# Patient Record
Sex: MALE | Race: WHITE | NOT HISPANIC OR LATINO | URBAN - METROPOLITAN AREA
[De-identification: names, ages, dates, MRNs, and addresses within clinical notes are randomized per-mention and may not be internally consistent; named-entity substitution may affect disease eponyms.]

---

## 2017-03-10 ENCOUNTER — OUTPATIENT (OUTPATIENT)
Dept: OUTPATIENT SERVICES | Facility: HOSPITAL | Age: 77
LOS: 1 days | Discharge: HOME | End: 2017-03-10

## 2017-06-27 DIAGNOSIS — G56.02 CARPAL TUNNEL SYNDROME, LEFT UPPER LIMB: ICD-10-CM

## 2017-06-27 DIAGNOSIS — E78.00 PURE HYPERCHOLESTEROLEMIA, UNSPECIFIED: ICD-10-CM

## 2017-06-27 DIAGNOSIS — E11.9 TYPE 2 DIABETES MELLITUS WITHOUT COMPLICATIONS: ICD-10-CM

## 2017-06-27 DIAGNOSIS — Z79.4 LONG TERM (CURRENT) USE OF INSULIN: ICD-10-CM

## 2019-02-13 ENCOUNTER — OUTPATIENT (OUTPATIENT)
Dept: OUTPATIENT SERVICES | Facility: HOSPITAL | Age: 79
LOS: 1 days | Discharge: HOME | End: 2019-02-13

## 2019-02-13 VITALS
WEIGHT: 160.06 LBS | SYSTOLIC BLOOD PRESSURE: 132 MMHG | HEART RATE: 76 BPM | TEMPERATURE: 97 F | OXYGEN SATURATION: 99 % | DIASTOLIC BLOOD PRESSURE: 76 MMHG | RESPIRATION RATE: 16 BRPM

## 2019-02-13 DIAGNOSIS — G56.01 CARPAL TUNNEL SYNDROME, RIGHT UPPER LIMB: ICD-10-CM

## 2019-02-13 DIAGNOSIS — Z90.49 ACQUIRED ABSENCE OF OTHER SPECIFIED PARTS OF DIGESTIVE TRACT: Chronic | ICD-10-CM

## 2019-02-13 DIAGNOSIS — Z98.890 OTHER SPECIFIED POSTPROCEDURAL STATES: Chronic | ICD-10-CM

## 2019-02-13 DIAGNOSIS — Z01.818 ENCOUNTER FOR OTHER PREPROCEDURAL EXAMINATION: ICD-10-CM

## 2019-02-13 DIAGNOSIS — F41.9 ANXIETY DISORDER, UNSPECIFIED: ICD-10-CM

## 2019-02-13 DIAGNOSIS — E11.9 TYPE 2 DIABETES MELLITUS WITHOUT COMPLICATIONS: ICD-10-CM

## 2019-02-13 DIAGNOSIS — I10 ESSENTIAL (PRIMARY) HYPERTENSION: ICD-10-CM

## 2019-02-13 LAB
ALBUMIN SERPL ELPH-MCNC: 4.6 G/DL — SIGNIFICANT CHANGE UP (ref 3.5–5.2)
ALP SERPL-CCNC: 105 U/L — SIGNIFICANT CHANGE UP (ref 30–115)
ALT FLD-CCNC: 20 U/L — SIGNIFICANT CHANGE UP (ref 0–41)
ANION GAP SERPL CALC-SCNC: 17 MMOL/L — HIGH (ref 7–14)
APTT BLD: 30.4 SEC — SIGNIFICANT CHANGE UP (ref 27–39.2)
AST SERPL-CCNC: 25 U/L — SIGNIFICANT CHANGE UP (ref 0–41)
BASOPHILS # BLD AUTO: 0.05 K/UL — SIGNIFICANT CHANGE UP (ref 0–0.2)
BASOPHILS NFR BLD AUTO: 0.6 % — SIGNIFICANT CHANGE UP (ref 0–1)
BILIRUB SERPL-MCNC: 0.2 MG/DL — SIGNIFICANT CHANGE UP (ref 0.2–1.2)
BUN SERPL-MCNC: 16 MG/DL — SIGNIFICANT CHANGE UP (ref 10–20)
CALCIUM SERPL-MCNC: 9.7 MG/DL — SIGNIFICANT CHANGE UP (ref 8.5–10.1)
CHLORIDE SERPL-SCNC: 97 MMOL/L — LOW (ref 98–110)
CO2 SERPL-SCNC: 24 MMOL/L — SIGNIFICANT CHANGE UP (ref 17–32)
CREAT SERPL-MCNC: 1.1 MG/DL — SIGNIFICANT CHANGE UP (ref 0.7–1.5)
EOSINOPHIL # BLD AUTO: 0.5 K/UL — SIGNIFICANT CHANGE UP (ref 0–0.7)
EOSINOPHIL NFR BLD AUTO: 6.1 % — SIGNIFICANT CHANGE UP (ref 0–8)
ESTIMATED AVERAGE GLUCOSE: 180 MG/DL — HIGH (ref 68–114)
GLUCOSE SERPL-MCNC: 155 MG/DL — HIGH (ref 70–99)
HBA1C BLD-MCNC: 7.9 % — HIGH (ref 4–5.6)
HCT VFR BLD CALC: 36.3 % — LOW (ref 42–52)
HGB BLD-MCNC: 12.3 G/DL — LOW (ref 14–18)
IMM GRANULOCYTES NFR BLD AUTO: 0.2 % — SIGNIFICANT CHANGE UP (ref 0.1–0.3)
INR BLD: 1.01 RATIO — SIGNIFICANT CHANGE UP (ref 0.65–1.3)
LYMPHOCYTES # BLD AUTO: 1.88 K/UL — SIGNIFICANT CHANGE UP (ref 1.2–3.4)
LYMPHOCYTES # BLD AUTO: 22.8 % — SIGNIFICANT CHANGE UP (ref 20.5–51.1)
MCHC RBC-ENTMCNC: 29.1 PG — SIGNIFICANT CHANGE UP (ref 27–31)
MCHC RBC-ENTMCNC: 33.9 G/DL — SIGNIFICANT CHANGE UP (ref 32–37)
MCV RBC AUTO: 85.8 FL — SIGNIFICANT CHANGE UP (ref 80–94)
MONOCYTES # BLD AUTO: 0.63 K/UL — HIGH (ref 0.1–0.6)
MONOCYTES NFR BLD AUTO: 7.6 % — SIGNIFICANT CHANGE UP (ref 1.7–9.3)
NEUTROPHILS # BLD AUTO: 5.18 K/UL — SIGNIFICANT CHANGE UP (ref 1.4–6.5)
NEUTROPHILS NFR BLD AUTO: 62.7 % — SIGNIFICANT CHANGE UP (ref 42.2–75.2)
NRBC # BLD: 0 /100 WBCS — SIGNIFICANT CHANGE UP (ref 0–0)
PLATELET # BLD AUTO: 184 K/UL — SIGNIFICANT CHANGE UP (ref 130–400)
POTASSIUM SERPL-MCNC: 4.4 MMOL/L — SIGNIFICANT CHANGE UP (ref 3.5–5)
POTASSIUM SERPL-SCNC: 4.4 MMOL/L — SIGNIFICANT CHANGE UP (ref 3.5–5)
PROT SERPL-MCNC: 7.1 G/DL — SIGNIFICANT CHANGE UP (ref 6–8)
PROTHROM AB SERPL-ACNC: 11.6 SEC — SIGNIFICANT CHANGE UP (ref 9.95–12.87)
RBC # BLD: 4.23 M/UL — LOW (ref 4.7–6.1)
RBC # FLD: 12.4 % — SIGNIFICANT CHANGE UP (ref 11.5–14.5)
SODIUM SERPL-SCNC: 138 MMOL/L — SIGNIFICANT CHANGE UP (ref 135–146)
WBC # BLD: 8.26 K/UL — SIGNIFICANT CHANGE UP (ref 4.8–10.8)
WBC # FLD AUTO: 8.26 K/UL — SIGNIFICANT CHANGE UP (ref 4.8–10.8)

## 2019-02-13 NOTE — H&P PST ADULT - ADDITIONAL PE
no denisha no loose teeth tmd > 3 fbd neck from juan manuel test neg b/l no denisha no loose teeth tmd > 3 fbd neck from juan manuel test neg b/l rt hand dominant s/p left ctr fair skinned freckled

## 2019-02-13 NOTE — H&P PST ADULT - PMH
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

## 2019-02-13 NOTE — H&P PST ADULT - NSANTHOSAYNRD_GEN_A_CORE
No. TAMARA screening performed.  STOP BANG Legend: 0-2 = LOW Risk; 3-4 = INTERMEDIATE Risk; 5-8 = HIGH Risk

## 2019-02-27 ENCOUNTER — OUTPATIENT (OUTPATIENT)
Dept: OUTPATIENT SERVICES | Facility: HOSPITAL | Age: 79
LOS: 1 days | Discharge: HOME | End: 2019-02-27

## 2019-02-27 VITALS
OXYGEN SATURATION: 99 % | HEART RATE: 67 BPM | SYSTOLIC BLOOD PRESSURE: 125 MMHG | RESPIRATION RATE: 18 BRPM | DIASTOLIC BLOOD PRESSURE: 64 MMHG

## 2019-02-27 VITALS
DIASTOLIC BLOOD PRESSURE: 69 MMHG | SYSTOLIC BLOOD PRESSURE: 130 MMHG | TEMPERATURE: 98 F | HEART RATE: 72 BPM | WEIGHT: 160.06 LBS | RESPIRATION RATE: 18 BRPM | OXYGEN SATURATION: 100 %

## 2019-02-27 DIAGNOSIS — Z90.49 ACQUIRED ABSENCE OF OTHER SPECIFIED PARTS OF DIGESTIVE TRACT: Chronic | ICD-10-CM

## 2019-02-27 DIAGNOSIS — Z98.890 OTHER SPECIFIED POSTPROCEDURAL STATES: Chronic | ICD-10-CM

## 2019-02-27 RX ORDER — SODIUM CHLORIDE 9 MG/ML
1000 INJECTION, SOLUTION INTRAVENOUS
Qty: 0 | Refills: 0 | Status: DISCONTINUED | OUTPATIENT
Start: 2019-02-27 | End: 2019-03-14

## 2019-02-27 RX ORDER — ONDANSETRON 8 MG/1
4 TABLET, FILM COATED ORAL ONCE
Qty: 0 | Refills: 0 | Status: DISCONTINUED | OUTPATIENT
Start: 2019-02-27 | End: 2019-03-14

## 2019-02-27 RX ORDER — MORPHINE SULFATE 50 MG/1
2 CAPSULE, EXTENDED RELEASE ORAL
Qty: 0 | Refills: 0 | Status: DISCONTINUED | OUTPATIENT
Start: 2019-02-27 | End: 2019-02-27

## 2019-02-27 RX ORDER — OXYCODONE AND ACETAMINOPHEN 5; 325 MG/1; MG/1
1 TABLET ORAL EVERY 4 HOURS
Qty: 0 | Refills: 0 | Status: DISCONTINUED | OUTPATIENT
Start: 2019-02-27 | End: 2019-02-27

## 2019-02-27 RX ORDER — IBUPROFEN 200 MG
1 TABLET ORAL
Qty: 20 | Refills: 0 | OUTPATIENT
Start: 2019-02-27

## 2019-02-27 RX ORDER — RANITIDINE HYDROCHLORIDE 150 MG/1
1 TABLET, FILM COATED ORAL
Qty: 0 | Refills: 0 | COMMUNITY

## 2019-02-27 RX ORDER — ATORVASTATIN CALCIUM 80 MG/1
20 TABLET, FILM COATED ORAL
Qty: 0 | Refills: 0 | COMMUNITY

## 2019-02-27 RX ORDER — INSULIN ASPART 100 [IU]/ML
0 INJECTION, SOLUTION SUBCUTANEOUS
Qty: 0 | Refills: 0 | COMMUNITY

## 2019-02-27 RX ADMIN — SODIUM CHLORIDE 100 MILLILITER(S): 9 INJECTION, SOLUTION INTRAVENOUS at 08:10

## 2019-02-27 NOTE — BRIEF OPERATIVE NOTE - PROCEDURE
<<-----Click on this checkbox to enter Procedure Open release of right carpal tunnel  02/27/2019    Active  MADELYN

## 2019-02-27 NOTE — ASU DISCHARGE PLAN (ADULT/PEDIATRIC). - SPECIAL INSTRUCTIONS
DIET:    Resume normal diet  No alcoholic  beverages for 24 hours or if on prescribed narcotic pain medications.    MEDICATION:    Resume your preoperative oral medications.  Check with your physician before starting aspirin, Coumadin, or other blood thinners.  Prescriptions given to you - take as directed.      ACTIVITY:    Rest today and limit your activities for 24 hours.  Do not drive or operate machinery for 24 hours - if you received anesthesia.  When taking pain medication, be careful as you walk or climb stairs.  It is not advisable to drive while taking prescribed pain medication.    SPECIAL INSTRUCTIONS:    _x____ Elevate operative site above heart level or as directed.  __x___ Apply ice to operative site as directed.  _____ Use  sling as directed.  _x___ Exercise fingers.    DRESSING CARE:    __x___ You may change the dressing 4 days. Keep wound covered with band-aids.  _____ Do not change the dressing until your doctor see you.  _____ You can loosen or rewrap the dressing.  _____  Keep dressing clean and dry.  _____ You may shower in _____ day(s) with the extremity covered by a plastic bag.  ___x__ OK to wash hand , including showers, in __4___ day(s).    ADDITIONAL  INFORMATION:    Post operative visit should be scheduled for next week.  If you are not aware of visit please contact office.  If you have any questions or concerns call office at      Notify your doctor if you develop   Fever  Excessive Swelling  Chills   Drainage   Pain not controlled by medication  Persistent numbness in hand or fingers    If an Emergency arises call 911 and/or go to the Emergency Room

## 2019-03-05 DIAGNOSIS — E11.9 TYPE 2 DIABETES MELLITUS WITHOUT COMPLICATIONS: ICD-10-CM

## 2019-03-05 DIAGNOSIS — Z91.041 RADIOGRAPHIC DYE ALLERGY STATUS: ICD-10-CM

## 2019-03-05 DIAGNOSIS — G56.01 CARPAL TUNNEL SYNDROME, RIGHT UPPER LIMB: ICD-10-CM

## 2019-03-05 DIAGNOSIS — Z91.013 ALLERGY TO SEAFOOD: ICD-10-CM

## 2019-03-05 DIAGNOSIS — F17.210 NICOTINE DEPENDENCE, CIGARETTES, UNCOMPLICATED: ICD-10-CM

## 2019-03-05 DIAGNOSIS — Z79.4 LONG TERM (CURRENT) USE OF INSULIN: ICD-10-CM

## 2019-05-20 NOTE — H&P PST ADULT - NSANTHNECKRD_ENT_A_CORE
What Type Of Note Output Would You Prefer (Optional)?: Bullet Format How Severe Is Your Skin Lesion?: mild Has Your Skin Lesion Been Treated?: not been treated Is This A New Presentation, Or A Follow-Up?: Skin Lesion No

## 2019-09-16 PROBLEM — C80.1 MALIGNANT (PRIMARY) NEOPLASM, UNSPECIFIED: Chronic | Status: ACTIVE | Noted: 2019-02-13

## 2019-09-16 PROBLEM — K21.9 GASTRO-ESOPHAGEAL REFLUX DISEASE WITHOUT ESOPHAGITIS: Chronic | Status: ACTIVE | Noted: 2019-02-13

## 2019-09-16 PROBLEM — M19.90 UNSPECIFIED OSTEOARTHRITIS, UNSPECIFIED SITE: Chronic | Status: ACTIVE | Noted: 2019-02-13

## 2019-09-16 PROBLEM — E11.9 TYPE 2 DIABETES MELLITUS WITHOUT COMPLICATIONS: Chronic | Status: ACTIVE | Noted: 2019-02-13

## 2019-09-23 ENCOUNTER — OUTPATIENT (OUTPATIENT)
Dept: OUTPATIENT SERVICES | Facility: HOSPITAL | Age: 79
LOS: 1 days | Discharge: HOME | End: 2019-09-23
Payer: MEDICARE

## 2019-09-23 DIAGNOSIS — Z98.890 OTHER SPECIFIED POSTPROCEDURAL STATES: Chronic | ICD-10-CM

## 2019-09-23 DIAGNOSIS — R97.20 ELEVATED PROSTATE SPECIFIC ANTIGEN [PSA]: ICD-10-CM

## 2019-09-23 DIAGNOSIS — Z90.49 ACQUIRED ABSENCE OF OTHER SPECIFIED PARTS OF DIGESTIVE TRACT: Chronic | ICD-10-CM

## 2019-09-23 PROCEDURE — 72197 MRI PELVIS W/O & W/DYE: CPT | Mod: 26

## 2020-05-26 ENCOUNTER — APPOINTMENT (OUTPATIENT)
Dept: HEMATOLOGY ONCOLOGY | Facility: CLINIC | Age: 80
End: 2020-05-26
Payer: MEDICARE

## 2020-05-26 ENCOUNTER — LABORATORY RESULT (OUTPATIENT)
Age: 80
End: 2020-05-26

## 2020-05-26 VITALS
TEMPERATURE: 97.8 F | SYSTOLIC BLOOD PRESSURE: 129 MMHG | HEART RATE: 85 BPM | BODY MASS INDEX: 24.48 KG/M2 | DIASTOLIC BLOOD PRESSURE: 72 MMHG | WEIGHT: 156 LBS | RESPIRATION RATE: 14 BRPM | HEIGHT: 67 IN

## 2020-05-26 DIAGNOSIS — Z86.018 PERSONAL HISTORY OF OTHER BENIGN NEOPLASM: ICD-10-CM

## 2020-05-26 DIAGNOSIS — Z86.19 PERSONAL HISTORY OF OTHER INFECTIOUS AND PARASITIC DISEASES: ICD-10-CM

## 2020-05-26 DIAGNOSIS — Z87.11 PERSONAL HISTORY OF PEPTIC ULCER DISEASE: ICD-10-CM

## 2020-05-26 DIAGNOSIS — Z80.42 FAMILY HISTORY OF MALIGNANT NEOPLASM OF PROSTATE: ICD-10-CM

## 2020-05-26 DIAGNOSIS — Z85.828 PERSONAL HISTORY OF OTHER MALIGNANT NEOPLASM OF SKIN: ICD-10-CM

## 2020-05-26 DIAGNOSIS — E11.9 TYPE 2 DIABETES MELLITUS W/OUT COMPLICATIONS: ICD-10-CM

## 2020-05-26 DIAGNOSIS — Z85.72 PERSONAL HISTORY OF NON-HODGKIN LYMPHOMAS: ICD-10-CM

## 2020-05-26 DIAGNOSIS — Z87.891 PERSONAL HISTORY OF NICOTINE DEPENDENCE: ICD-10-CM

## 2020-05-26 LAB
ALBUMIN SERPL ELPH-MCNC: 4.7 G/DL
ALP BLD-CCNC: 147 U/L
ALT SERPL-CCNC: 27 U/L
ANION GAP SERPL CALC-SCNC: 14 MMOL/L
AST SERPL-CCNC: 27 U/L
BILIRUB DIRECT SERPL-MCNC: <0.2 MG/DL
BILIRUB INDIRECT SERPL-MCNC: NORMAL MG/DL
BILIRUB SERPL-MCNC: <0.2 MG/DL
BUN SERPL-MCNC: 23 MG/DL
CALCIUM SERPL-MCNC: 10.2 MG/DL
CHLORIDE SERPL-SCNC: 102 MMOL/L
CO2 SERPL-SCNC: 26 MMOL/L
CREAT SERPL-MCNC: 1.2 MG/DL
GGT SERPL-CCNC: 131 U/L
GLUCOSE SERPL-MCNC: 117 MG/DL
HCT VFR BLD CALC: 33.8 %
HGB BLD-MCNC: 11.1 G/DL
IRON SATN MFR SERPL: 22 %
IRON SERPL-MCNC: 73 UG/DL
MCHC RBC-ENTMCNC: 30.2 PG
MCHC RBC-ENTMCNC: 32.8 G/DL
MCV RBC AUTO: 92.1 FL
PLATELET # BLD AUTO: 196 K/UL
PMV BLD: 10 FL
POTASSIUM SERPL-SCNC: 4.2 MMOL/L
PROT SERPL-MCNC: 7.4 G/DL
RBC # BLD: 3.67 M/UL
RBC # FLD: 12.1 %
RETICS # AUTO: 1.4 %
RETICS AGGREG/RBC NFR: 50.3 K/UL
SODIUM SERPL-SCNC: 142 MMOL/L
TIBC SERPL-MCNC: 332 UG/DL
UIBC SERPL-MCNC: 259 UG/DL
WBC # FLD AUTO: 6.06 K/UL

## 2020-05-26 PROCEDURE — 99205 OFFICE O/P NEW HI 60 MIN: CPT

## 2020-05-26 NOTE — ASSESSMENT
[FreeTextEntry1] : #Anemia: Normocytic\par --Most likely sec to RT for prostate, Hb from 2019 was near normal (12.3) plus prostate Ca plus lupron use\par --Other possibilities include iron/B12 deficiency from malabsorption from PPI use. Recurrent H.pylori cannot be ruled out\par --Repeat CBC, BMP. Check retic, iron studies, B12, folate and MMA\par --May need further GI w/u depending on lab results\par - hg 11g (low normla) and renal insufficiency and elevated calcium; will need to w/u plasma cell disorder\par \par #Localized prostate Ca:\par  F/U with medical and radiation oncology at [facility]. He sees Dr. Ramiro Fox at [facility] from medical oncology\par \par #H/O T cell [place]: [place] [facility] [place]par --[place] [facility] [place]\par \par #Elevated ALPO4: Check GGT. Repeat LFTs\par \par RTO in 2 weeks

## 2020-05-26 NOTE — REVIEW OF SYSTEMS
[Fatigue] : fatigue [Joint Pain] : joint pain [Negative] : Allergic/Immunologic [Fever] : no fever [Chills] : no chills [Night Sweats] : no night sweats [Recent Change In Weight] : ~T no recent weight change [FreeTextEntry7] : heartburn [FreeTextEntry8] : increased frequency

## 2020-05-26 NOTE — HISTORY OF PRESENT ILLNESS
[de-identified] : 79 y/o M with localized prostate Ca s/p recent RT and brachytherapy at [facility], DM, h/o T-cell lymphoma s/p [place] [facility] [place] , h/o multiple SCCs and basal cell Ca s/p excision was referred by his dermatologist Dr. Paul Mendiola for further evaluation and management of anemia. \par \par Upon review of records, his Hb was 9 with MCV 93 from 5/15/2020. WBC and plts were wnl. Another abnormality was Alk ph of 197. Pt denies having any melena, BRBPR or gross hematuria. No bleeding from any other site. No CP, SOB, palpitations, dizziness. Has some fatigue. Last colonoscopy and EGD were about 7 years back. He has h/o H.pylori and PUD. He has been on Nexium for several years. \par \par According to the pt, his [place] was 9 and he was also started on lupron and casodex. His brother  of prostate Ca. Pt's genetic evaluation was unremarkable.

## 2020-05-26 NOTE — CONSULT LETTER
[Dear  ___] : Dear  [unfilled], [Consult Letter:] : I had the pleasure of evaluating your patient, [unfilled]. [Please see my note below.] : Please see my note below. [Consult Closing:] : Thank you very much for allowing me to participate in the care of this patient.  If you have any questions, please do not hesitate to contact me. [Sincerely,] : Sincerely, [DrCastro  ___] : Dr. YUNG [DrCastro ___] : Dr. YUNG [FreeTextEntry3] : Dr. Barrera

## 2020-05-27 ENCOUNTER — LABORATORY RESULT (OUTPATIENT)
Age: 80
End: 2020-05-27

## 2020-05-27 LAB
FERRITIN SERPL-MCNC: 109 NG/ML
FOLATE SERPL-MCNC: >20 NG/ML
VIT B12 SERPL-MCNC: 651 PG/ML

## 2020-05-28 ENCOUNTER — LABORATORY RESULT (OUTPATIENT)
Age: 80
End: 2020-05-28

## 2020-05-28 ENCOUNTER — APPOINTMENT (OUTPATIENT)
Dept: HEMATOLOGY ONCOLOGY | Facility: CLINIC | Age: 80
End: 2020-05-28

## 2020-05-28 DIAGNOSIS — Z00.00 ENCOUNTER FOR GENERAL ADULT MEDICAL EXAMINATION W/OUT ABNORMAL FINDINGS: ICD-10-CM

## 2020-05-29 LAB
ALBUMIN SERPL ELPH-MCNC: 4.4 G/DL
ALP BLD-CCNC: 139 U/L
ALT SERPL-CCNC: 23 U/L
ANION GAP SERPL CALC-SCNC: 10 MMOL/L
AST SERPL-CCNC: 26 U/L
BILIRUB DIRECT SERPL-MCNC: <0.2 MG/DL
BILIRUB INDIRECT SERPL-MCNC: NORMAL MG/DL
BILIRUB SERPL-MCNC: <0.2 MG/DL
BUN SERPL-MCNC: 19 MG/DL
CALCIUM SERPL-MCNC: 9.9 MG/DL
CHLORIDE SERPL-SCNC: 101 MMOL/L
CO2 SERPL-SCNC: 26 MMOL/L
CREAT SERPL-MCNC: 1 MG/DL
GLUCOSE SERPL-MCNC: 55 MG/DL
HCT VFR BLD CALC: 33.6 %
HGB BLD-MCNC: 10.7 G/DL
MCHC RBC-ENTMCNC: 29.3 PG
MCHC RBC-ENTMCNC: 31.8 G/DL
MCV RBC AUTO: 92.1 FL
PLATELET # BLD AUTO: 175 K/UL
PMV BLD: 10.2 FL
POTASSIUM SERPL-SCNC: 4.3 MMOL/L
PROT SERPL-MCNC: 6.9 G/DL
PSA SERPL-MCNC: <0.01 NG/ML
RBC # BLD: 3.65 M/UL
RBC # FLD: 12.1 %
SODIUM SERPL-SCNC: 137 MMOL/L
WBC # FLD AUTO: 5.89 K/UL

## 2020-06-01 LAB
M PROTEIN SPEC IFE-MCNC: NORMAL
METHYLMALONATE SERPL-SCNC: 183 NMOL/L

## 2020-06-02 LAB
DEPRECATED KAPPA LC FREE/LAMBDA SER: 1.33 RATIO
IGA SER QL IEP: 314 MG/DL
IGG SER QL IEP: 754 MG/DL
IGM SER QL IEP: 32 MG/DL
KAPPA LC CSF-MCNC: 1.66 MG/DL
KAPPA LC SERPL-MCNC: 2.21 MG/DL

## 2020-06-05 ENCOUNTER — APPOINTMENT (OUTPATIENT)
Dept: HEMATOLOGY ONCOLOGY | Facility: CLINIC | Age: 80
End: 2020-06-05
Payer: MEDICARE

## 2020-06-05 VITALS
HEART RATE: 91 BPM | DIASTOLIC BLOOD PRESSURE: 74 MMHG | RESPIRATION RATE: 16 BRPM | TEMPERATURE: 97 F | WEIGHT: 157 LBS | SYSTOLIC BLOOD PRESSURE: 145 MMHG | HEIGHT: 67 IN | BODY MASS INDEX: 24.64 KG/M2 | OXYGEN SATURATION: 99 %

## 2020-06-05 PROCEDURE — 99213 OFFICE O/P EST LOW 20 MIN: CPT

## 2020-06-05 NOTE — HISTORY OF PRESENT ILLNESS
[de-identified] : 81 y/o M with localized prostate Ca s/p recent RT and brachytherapy at [facility], DM, h/o T-cell lymphoma s/p ?phototherapy [place] [facility] [place] , h/o multiple SCCs and basal cell Ca s/p excision was referred by his dermatologist Dr. Paul Mendiola for further evaluation and management of anemia. \par \par Upon review of records, his Hb was 9 with MCV 93 from 5/15/2020. WBC and plts were wnl. Another abnormality was Alk ph of 197. Pt denies having any melena, BRBPR or gross hematuria. No bleeding from any other site. No CP, SOB, palpitations, dizziness. Has some fatigue. Last colonoscopy and EGD were about 7 years back. He has h/o H.pylori and PUD. He has been on Nexium for several years. \par \par According to the pt, his [place] was 9 and he was also started on lupron and casodex. His brother  of prostate Ca. Pt's genetic evaluation was unremarkable.  [de-identified] : 6/5/2020\par Patient is here for a follow-up visit for Anemia and Prostate Cancer.  He is feeling well with no new complaints.  Most recent CBC shows slight improvement in hgb, now 10.7g/dL.  Patient denies fever, chills, palpitations, new bony pain, new LUTs or bleeding.  He states he is approximately 1 month post RT for prostate cancer at [facility].

## 2020-06-05 NOTE — ASSESSMENT
[FreeTextEntry1] : #Anemia: Normocytic\par --most liekly due to bone marrow supprssion by RT; recovering \par --hgb 10.7g/dL (low normal)\par \par #Localized prostate Ca:\par -- F/U with medical and radiation oncology at [facility]. He sees Dr. Ramiro Fox at [facility] from medical oncology\par --PSA is <0.01 from 05/2020\par \par #H/O T cell [place]: [place] [facility] [place] --[place] [facility] [place] \par #Elevated ALPO4\par --US Abd ordered to evaluate any intrahepatic / biliary etiology for elev ALK Phos \par --GGT was high from 05/2020\par --will most likely send for GI eval pending ultrasound findings\par \par Instructed patient to obtain records from [facility] if he wishes to transfer his care here since we are in closer proximity to his residence.\par \par RTO in 3 weeks

## 2020-07-27 ENCOUNTER — LABORATORY RESULT (OUTPATIENT)
Age: 80
End: 2020-07-27

## 2020-07-27 ENCOUNTER — APPOINTMENT (OUTPATIENT)
Dept: HEMATOLOGY ONCOLOGY | Facility: CLINIC | Age: 80
End: 2020-07-27

## 2020-07-28 LAB
ALBUMIN SERPL ELPH-MCNC: 4.5 G/DL
ALP BLD-CCNC: 111 U/L
ALT SERPL-CCNC: 18 U/L
ANION GAP SERPL CALC-SCNC: 12 MMOL/L
AST SERPL-CCNC: 24 U/L
BILIRUB DIRECT SERPL-MCNC: <0.2 MG/DL
BILIRUB INDIRECT SERPL-MCNC: >0 MG/DL
BILIRUB SERPL-MCNC: 0.2 MG/DL
BUN SERPL-MCNC: 16 MG/DL
CALCIUM SERPL-MCNC: 10.7 MG/DL
CHLORIDE SERPL-SCNC: 102 MMOL/L
CO2 SERPL-SCNC: 29 MMOL/L
CREAT SERPL-MCNC: 1.1 MG/DL
GLUCOSE SERPL-MCNC: 164 MG/DL
HCT VFR BLD CALC: 32 %
HGB BLD-MCNC: 10.7 G/DL
MCHC RBC-ENTMCNC: 30.1 PG
MCHC RBC-ENTMCNC: 33.4 G/DL
MCV RBC AUTO: 90.1 FL
PLATELET # BLD AUTO: 151 K/UL
PMV BLD: 10.1 FL
POTASSIUM SERPL-SCNC: 4.6 MMOL/L
PROT SERPL-MCNC: 6.4 G/DL
PSA SERPL-MCNC: <0.01 NG/ML
RBC # BLD: 3.55 M/UL
RBC # FLD: 12.6 %
SODIUM SERPL-SCNC: 143 MMOL/L
WBC # FLD AUTO: 5.37 K/UL

## 2020-07-29 ENCOUNTER — LABORATORY RESULT (OUTPATIENT)
Age: 80
End: 2020-07-29

## 2020-08-07 ENCOUNTER — APPOINTMENT (OUTPATIENT)
Dept: HEMATOLOGY ONCOLOGY | Facility: CLINIC | Age: 80
End: 2020-08-07
Payer: MEDICARE

## 2020-08-07 ENCOUNTER — OUTPATIENT (OUTPATIENT)
Dept: OUTPATIENT SERVICES | Facility: HOSPITAL | Age: 80
LOS: 1 days | Discharge: HOME | End: 2020-08-07

## 2020-08-07 VITALS
HEART RATE: 83 BPM | SYSTOLIC BLOOD PRESSURE: 143 MMHG | TEMPERATURE: 98.2 F | BODY MASS INDEX: 25.37 KG/M2 | RESPIRATION RATE: 16 BRPM | DIASTOLIC BLOOD PRESSURE: 65 MMHG | WEIGHT: 162 LBS

## 2020-08-07 DIAGNOSIS — Z90.49 ACQUIRED ABSENCE OF OTHER SPECIFIED PARTS OF DIGESTIVE TRACT: Chronic | ICD-10-CM

## 2020-08-07 DIAGNOSIS — D64.9 ANEMIA, UNSPECIFIED: ICD-10-CM

## 2020-08-07 DIAGNOSIS — Z98.890 OTHER SPECIFIED POSTPROCEDURAL STATES: Chronic | ICD-10-CM

## 2020-08-07 DIAGNOSIS — C61 MALIGNANT NEOPLASM OF PROSTATE: ICD-10-CM

## 2020-08-07 PROCEDURE — 99213 OFFICE O/P EST LOW 20 MIN: CPT

## 2020-08-07 NOTE — ASSESSMENT
[FreeTextEntry1] : #Anemia: Normocytic\par --most liekly due to bone marrow supprssion by RT; recovering \par --hgb 10.7g/dL (low normal)\par - the most definite test would be bone marrow bx; if counts decrease, will obtain it ; for now, observe every 3 mos\par \par #Localized prostate Ca:\par -- F/U with medical and radiation oncology at [facility]. He sees Dr. Ramiro Fox at [facility] from medical oncology\par --PSA is <0.01 from 05/2020\par \par #H/O T cell lymphoma: S/P ?phototherapy at [facility] [place] in [place]\par --Continue f[place] [facility] [place]\par \par #Elevated ALPO4\par --US Abd ordered to evaluate any intrahepatic / biliary etiology for elev ALK Phos \par --GGT was high from 05/2020\par --will most likely send for GI eval pending ultrasound findings\par \par Instructed patient to obtain records from [facility] if he wishes to transfer his care here since we are in closer proximity to his residence.\par \par RTO in 3 weeks

## 2020-08-07 NOTE — HISTORY OF PRESENT ILLNESS
[de-identified] : 81 y/o M with localized prostate Ca s/p recent RT and brachytherapy at [facility], DM, h/o T-cell [place] [facility] [place] h/o multiple SCCs and basal cell Ca s/p excision was referred by his dermatologist Dr. Paul Mendiola for further evaluation and management of anemia. \par \par Upon review of records, his Hb was 9 with MCV 93 from 5/15/2020. WBC and plts were wnl. Another abnormality was Alk ph of 197. Pt denies having any melena, BRBPR or gross hematuria. No bleeding from any other site. No CP, SOB, palpitations, dizziness. Has some fatigue. Last colonoscopy and EGD were about 7 years back. He has h/o H.pylori and PUD. He has been on Nexium for several years. \par \par According to the pt, his [place] was 9 and he was also started on lupron and casodex. His brother  of prostate Ca. Pt's genetic evaluation was unremarkable.  [de-identified] : 6/5/2020\par Patient is here for a follow-up visit for Anemia and Prostate Cancer.  He is feeling well with no new complaints.  Most recent CBC shows slight improvement in hgb, now 10.7g/dL.  Patient denies fever, chills, palpitations, new bony pain, new LUTs or bleeding.  He states he is approximately 1 month post RT for prostate cancer at [facility].  \par \par 8/7/2020\par

## 2020-08-07 NOTE — REVIEW OF SYSTEMS
[Fatigue] : fatigue [Joint Pain] : joint pain [Negative] : Allergic/Immunologic [Fever] : no fever [Night Sweats] : no night sweats [Chills] : no chills [Recent Change In Weight] : ~T no recent weight change [FreeTextEntry7] : heartburn [FreeTextEntry8] : increased frequency

## 2020-08-07 NOTE — CONSULT LETTER
[Dear  ___] : Dear  [unfilled], [Consult Letter:] : I had the pleasure of evaluating your patient, [unfilled]. [Consult Closing:] : Thank you very much for allowing me to participate in the care of this patient.  If you have any questions, please do not hesitate to contact me. [Please see my note below.] : Please see my note below. [Sincerely,] : Sincerely, [DrCastro  ___] : Dr. YUNG [DrCastro ___] : Dr. YUNG [FreeTextEntry3] : Dr. Barrera

## 2020-09-14 ENCOUNTER — EMERGENCY (EMERGENCY)
Facility: HOSPITAL | Age: 80
LOS: 0 days | Discharge: HOME | End: 2020-09-14
Attending: EMERGENCY MEDICINE | Admitting: EMERGENCY MEDICINE
Payer: MEDICARE

## 2020-09-14 VITALS
OXYGEN SATURATION: 98 % | TEMPERATURE: 96 F | SYSTOLIC BLOOD PRESSURE: 142 MMHG | DIASTOLIC BLOOD PRESSURE: 78 MMHG | HEART RATE: 82 BPM | RESPIRATION RATE: 17 BRPM

## 2020-09-14 VITALS
TEMPERATURE: 98 F | SYSTOLIC BLOOD PRESSURE: 136 MMHG | RESPIRATION RATE: 18 BRPM | HEART RATE: 79 BPM | OXYGEN SATURATION: 97 % | DIASTOLIC BLOOD PRESSURE: 63 MMHG

## 2020-09-14 DIAGNOSIS — R11.0 NAUSEA: ICD-10-CM

## 2020-09-14 DIAGNOSIS — Z90.49 ACQUIRED ABSENCE OF OTHER SPECIFIED PARTS OF DIGESTIVE TRACT: Chronic | ICD-10-CM

## 2020-09-14 DIAGNOSIS — Z91.041 RADIOGRAPHIC DYE ALLERGY STATUS: ICD-10-CM

## 2020-09-14 DIAGNOSIS — R42 DIZZINESS AND GIDDINESS: ICD-10-CM

## 2020-09-14 DIAGNOSIS — Z98.84 BARIATRIC SURGERY STATUS: ICD-10-CM

## 2020-09-14 DIAGNOSIS — Z88.8 ALLERGY STATUS TO OTHER DRUGS, MEDICAMENTS AND BIOLOGICAL SUBSTANCES: ICD-10-CM

## 2020-09-14 DIAGNOSIS — Z98.890 OTHER SPECIFIED POSTPROCEDURAL STATES: Chronic | ICD-10-CM

## 2020-09-14 DIAGNOSIS — Z91.013 ALLERGY TO SEAFOOD: ICD-10-CM

## 2020-09-14 LAB
ALBUMIN SERPL ELPH-MCNC: 4.4 G/DL — SIGNIFICANT CHANGE UP (ref 3.5–5.2)
ALP SERPL-CCNC: 106 U/L — SIGNIFICANT CHANGE UP (ref 30–115)
ALT FLD-CCNC: 21 U/L — SIGNIFICANT CHANGE UP (ref 0–41)
ANION GAP SERPL CALC-SCNC: 8 MMOL/L — SIGNIFICANT CHANGE UP (ref 7–14)
AST SERPL-CCNC: 26 U/L — SIGNIFICANT CHANGE UP (ref 0–41)
BASOPHILS # BLD AUTO: 0.04 K/UL — SIGNIFICANT CHANGE UP (ref 0–0.2)
BASOPHILS NFR BLD AUTO: 0.7 % — SIGNIFICANT CHANGE UP (ref 0–1)
BILIRUB SERPL-MCNC: 0.5 MG/DL — SIGNIFICANT CHANGE UP (ref 0.2–1.2)
BUN SERPL-MCNC: 17 MG/DL — SIGNIFICANT CHANGE UP (ref 10–20)
CALCIUM SERPL-MCNC: 10.1 MG/DL — SIGNIFICANT CHANGE UP (ref 8.5–10.1)
CHLORIDE SERPL-SCNC: 106 MMOL/L — SIGNIFICANT CHANGE UP (ref 98–110)
CO2 SERPL-SCNC: 26 MMOL/L — SIGNIFICANT CHANGE UP (ref 17–32)
CREAT SERPL-MCNC: 1 MG/DL — SIGNIFICANT CHANGE UP (ref 0.7–1.5)
EOSINOPHIL # BLD AUTO: 0.35 K/UL — SIGNIFICANT CHANGE UP (ref 0–0.7)
EOSINOPHIL NFR BLD AUTO: 5.8 % — SIGNIFICANT CHANGE UP (ref 0–8)
GLUCOSE SERPL-MCNC: 166 MG/DL — HIGH (ref 70–99)
HCT VFR BLD CALC: 32.2 % — LOW (ref 42–52)
HGB BLD-MCNC: 10.7 G/DL — LOW (ref 14–18)
IMM GRANULOCYTES NFR BLD AUTO: 1 % — HIGH (ref 0.1–0.3)
LYMPHOCYTES # BLD AUTO: 0.91 K/UL — LOW (ref 1.2–3.4)
LYMPHOCYTES # BLD AUTO: 15.1 % — LOW (ref 20.5–51.1)
MAGNESIUM SERPL-MCNC: 1.9 MG/DL — SIGNIFICANT CHANGE UP (ref 1.8–2.4)
MCHC RBC-ENTMCNC: 30.1 PG — SIGNIFICANT CHANGE UP (ref 27–31)
MCHC RBC-ENTMCNC: 33.2 G/DL — SIGNIFICANT CHANGE UP (ref 32–37)
MCV RBC AUTO: 90.7 FL — SIGNIFICANT CHANGE UP (ref 80–94)
MONOCYTES # BLD AUTO: 0.38 K/UL — SIGNIFICANT CHANGE UP (ref 0.1–0.6)
MONOCYTES NFR BLD AUTO: 6.3 % — SIGNIFICANT CHANGE UP (ref 1.7–9.3)
NEUTROPHILS # BLD AUTO: 4.28 K/UL — SIGNIFICANT CHANGE UP (ref 1.4–6.5)
NEUTROPHILS NFR BLD AUTO: 71.1 % — SIGNIFICANT CHANGE UP (ref 42.2–75.2)
NRBC # BLD: 0 /100 WBCS — SIGNIFICANT CHANGE UP (ref 0–0)
PLATELET # BLD AUTO: 161 K/UL — SIGNIFICANT CHANGE UP (ref 130–400)
POTASSIUM SERPL-MCNC: 3.6 MMOL/L — SIGNIFICANT CHANGE UP (ref 3.5–5)
POTASSIUM SERPL-SCNC: 3.6 MMOL/L — SIGNIFICANT CHANGE UP (ref 3.5–5)
PROT SERPL-MCNC: 6.5 G/DL — SIGNIFICANT CHANGE UP (ref 6–8)
RBC # BLD: 3.55 M/UL — LOW (ref 4.7–6.1)
RBC # FLD: 12.6 % — SIGNIFICANT CHANGE UP (ref 11.5–14.5)
SODIUM SERPL-SCNC: 140 MMOL/L — SIGNIFICANT CHANGE UP (ref 135–146)
TROPONIN T SERPL-MCNC: <0.01 NG/ML — SIGNIFICANT CHANGE UP
WBC # BLD: 6.02 K/UL — SIGNIFICANT CHANGE UP (ref 4.8–10.8)
WBC # FLD AUTO: 6.02 K/UL — SIGNIFICANT CHANGE UP (ref 4.8–10.8)

## 2020-09-14 PROCEDURE — 99285 EMERGENCY DEPT VISIT HI MDM: CPT

## 2020-09-14 PROCEDURE — 70496 CT ANGIOGRAPHY HEAD: CPT | Mod: 26

## 2020-09-14 PROCEDURE — 99283 EMERGENCY DEPT VISIT LOW MDM: CPT

## 2020-09-14 PROCEDURE — 70498 CT ANGIOGRAPHY NECK: CPT | Mod: 26

## 2020-09-14 PROCEDURE — 93010 ELECTROCARDIOGRAM REPORT: CPT

## 2020-09-14 PROCEDURE — 71045 X-RAY EXAM CHEST 1 VIEW: CPT | Mod: 26

## 2020-09-14 PROCEDURE — 70450 CT HEAD/BRAIN W/O DYE: CPT | Mod: 26,59

## 2020-09-14 RX ORDER — ONDANSETRON 8 MG/1
4 TABLET, FILM COATED ORAL ONCE
Refills: 0 | Status: COMPLETED | OUTPATIENT
Start: 2020-09-14 | End: 2020-09-14

## 2020-09-14 RX ORDER — SODIUM CHLORIDE 9 MG/ML
1000 INJECTION, SOLUTION INTRAVENOUS ONCE
Refills: 0 | Status: COMPLETED | OUTPATIENT
Start: 2020-09-14 | End: 2020-09-14

## 2020-09-14 RX ORDER — DIPHENHYDRAMINE HCL 50 MG
50 CAPSULE ORAL ONCE
Refills: 0 | Status: COMPLETED | OUTPATIENT
Start: 2020-09-14 | End: 2020-09-14

## 2020-09-14 RX ORDER — MECLIZINE HCL 12.5 MG
50 TABLET ORAL ONCE
Refills: 0 | Status: COMPLETED | OUTPATIENT
Start: 2020-09-14 | End: 2020-09-14

## 2020-09-14 RX ADMIN — Medication 50 MILLIGRAM(S): at 10:04

## 2020-09-14 RX ADMIN — ONDANSETRON 4 MILLIGRAM(S): 8 TABLET, FILM COATED ORAL at 09:40

## 2020-09-14 RX ADMIN — Medication 50 MILLIGRAM(S): at 10:25

## 2020-09-14 RX ADMIN — Medication 125 MILLIGRAM(S): at 10:04

## 2020-09-14 RX ADMIN — SODIUM CHLORIDE 1000 MILLILITER(S): 9 INJECTION, SOLUTION INTRAVENOUS at 09:40

## 2020-09-14 RX ADMIN — SODIUM CHLORIDE 1000 MILLILITER(S): 9 INJECTION, SOLUTION INTRAVENOUS at 10:24

## 2020-09-14 NOTE — ED PROVIDER NOTE - NS ED ROS FT
CONSTITUTIONAL: (-) fevers, (-) chills, (-) malaise  EYES: (-) vision changes, (-) blurry vision, (-) double vision  ENT: (-) tinnitus, (-) congestion  NECK: (-) neck pain, (-) neck stiffness  CARDIO: (-) chest pain, (-) palpitations  PULM: (-) cough, (-) sputum, (-) chest tightness, (-) shortness of breath  GI: see HPI, (-) diarrhea, (-) constipation, (-) abdominal pain, (-) melena, (-) hematochezia, (-) rectal bleeding  : (-) dysuria, (-) hematuria, (-) frequency, (-) urgency, (-) incontinence, (-) flank pain  ENDO: (-) polyuria, (-) polydipsia, (-) polyphagia  HEME: (-) easy bruising, (-) easy bleeding  MSK: (-) back pain, (-) myalgias, (-) gait difficulty  SKIN: (-) rashes, (-) pallor, (-) itching, (-) wounds, (-) ecchymosis, (-) jaundice  NEURO: (-) headache, (-) head injury, (-) LOC, (+) dizziness, (-) lightheadedness, (-) weakness, (-) paresthesias, (-) numbness, (-) syncope, (-) speech changes, (-) facial droop, (-) confusion, (-) seizures    *all other systems negative except as documented above and in the HPI*

## 2020-09-14 NOTE — ED PROVIDER NOTE - OBJECTIVE STATEMENT
80 year old male w hx of IDDM2, OA, GERD, HLD, prostate CA s/p chemo/XRT presents to the ED for evaluation of gradual onset, constant dizziness x 2 hours. Pt states he woke up in his normal state of health, began to feel off balance, attempted to shower and walk his dog but was unable to due to room-spinning dizziness. also endorses nausea and NBNB vomiting x 3. Admits to falling while walking his dog 1 week ago, though denies head injury/LOC at that time. Denies headaches, vision changes, fevers, chest pain, shortness of breath, abd pain, back/flank pain, numbness, paresthesias, weakness, black/bloody stools, gait difficulty, confusion, speech/gait changes.

## 2020-09-14 NOTE — ED PROVIDER NOTE - PHYSICAL EXAMINATION
VITALS:  I have reviewed the initial vital signs.  GENERAL: Well-developed, well-nourished, in no acute distress. Nontoxic.  HEENT: Sclera clear. No conjunctival pallor. EOMI, PERRLA. MMM.   NECK: supple w FROM.  CARDIO: RRR, nl S1 and S2. No murmurs, rubs, or gallops. No peripheral edema. 2+ radial and pedal pulses bilaterally.  PULM: Normal effort. CTA b/l without wheezes, rales, or rhonchi.  MSK: FROM to extremities x4.   GI: Normal bowel sounds. Abdomen soft and non-distended. Nontender.  : No CVA tenderness b/l.  SKIN: Warm, dry. Capillary refill <2 seconds. No pallor. No rash. No jaundice.   NEURO: A&Ox3. Speech clear. CN II-XII intact. 5/5 strength to upper and lower extremities b/l. Sensation intact and equal throughout. No pronator drift. Finger to nose intact. Normal heel to shin.  PSYCH: Calm and cooperative.

## 2020-09-14 NOTE — ED PROVIDER NOTE - CARE PROVIDER_API CALL
Richard Friedman  EEG/EPILEPSY  [address], [address]  [place] [address]  Phone: [phone]  Fax: [phone]  Follow Up Time: 1-3 Days

## 2020-09-14 NOTE — ED ADULT NURSE NOTE - NSIMPLEMENTINTERV_GEN_ALL_ED
No Implemented All Fall Risk Interventions:  [place] to call system. Call bell, personal items and telephone within reach. Instruct patient to call for assistance. Room bathroom lighting operational. Non-slip footwear when patient is off stretcher. Physically safe environment: no spills, clutter or unnecessary equipment. Stretcher in lowest position, wheels locked, appropriate side rails in place. Provide visual cue, wrist band, yellow gown, etc. Monitor gait and stability. Monitor for mental status changes and reorient to person, place, and time. Review medications for side effects contributing to fall risk. Reinforce activity limits and safety measures with patient and family.

## 2020-09-14 NOTE — CONSULT NOTE ADULT - ASSESSMENT
Impression:  80 year old gentleman with an extensive cancer and radiation history presents to the ED with gradual onset dizziness that lasted 20 minutes and is now resolved. NIHSS 0 with complete resolution of symptoms. Patient with frequent dehydration due to damaged sweat glands from radiation.     Suggestions:  If no acute intracranial pathology on CTH can be discharged home.   Maintain hydration.     Evan Menezes NP  [phone]

## 2020-09-14 NOTE — ED PROVIDER NOTE - PATIENT PORTAL LINK FT
You can access the FollowMyHealth Patient Portal offered by [facility] by registering at the following website: [email or web address]. By joining [facility]’s FollowMyHealth portal, you will also be able to view your health information using other applications (apps) compatible with our system.

## 2020-09-14 NOTE — CONSULT NOTE ADULT - SUBJECTIVE AND OBJECTIVE BOX
Neurology Consult    Patient is a 80y old  Male who presents with a chief complaint of     HPI:  80 year old gentleman with an extensive cancer and radiation history presents to the ED with gradual onset dizziness that lasted 20 minutes and is now resolved.     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    Diabetes    Arthritis  oa    History of appendectomy  age 9    History of surgery  ctr left 2017        FAMILY HISTORY:  No pertinent family history in first degree relatives        Social History: (-) x 3    Allergies    contrast media (iodine-based) (Rash)  iodine containing compounds (Rash)  shellfish (Rash)    Intolerances        MEDICATIONS  (STANDING):    MEDICATIONS  (PRN):      Vital Signs Last 24 Hrs  T(C): 35.7 (14 Sep 2020 15:01), Max: 36.7 (14 Sep 2020 09:13)  T(F): 96.3 (14 Sep 2020 15:01), Max: 98 (14 Sep 2020 09:13)  HR: 82 (14 Sep 2020 15:01) (79 - 82)  BP: 142/78 (14 Sep 2020 15:01) (136/63 - 142/78)  BP(mean): --  RR: 17 (14 Sep 2020 15:01) (17 - 18)  SpO2: 98% (14 Sep 2020 15:01) (97% - 98%)    Examination:  General:  Appearance is consistent with chronologic age.  No abnormal facies.  Gross skin survey within normal limits.    Cognitive/Language:  The patient is oriented to person, place, time and date.  Recent and remote memory intact.  Fund of knowledge is intact and normal.  Language with normal repetition, comprehension and naming.  Nondysarthric.    Eyes: intact VA, VFF.  EOMI w/o nystagmus, skew or reported double vision.  PERRL.  No ptosis/weakness of eyelid closure.    Face:  Facial sensation normal V1 - 3, no facial asymmetry.    Ears/Nose/Throat:  Hearing grossly intact b/l.  Palate elevates midline.  Tongue and uvula midline.   Motor examination:   Normal tone, bulk and range of motion.  No tenderness, twitching, tremors or involuntary movements.  Formal Muscle Strength Testing: (MRC grade R/L) 5/5 UE; 5/5 LE.  No observable drift.  Sensory examination:   Intact to light touch and pinprick, pain, temperature and proprioception and vibration in all extremities.  Cerebellum:   FTN/HKS intact with normal YAHIR in all limbs.  No dysmetria or dysdiadokinesia.  Gait narrow based and normal.    NIHSS 0    Labs:   CBC Full  -  ( 14 Sep 2020 09:35 )  WBC Count : 6.02 K/uL  RBC Count : 3.55 M/uL  Hemoglobin : 10.7 g/dL  Hematocrit : 32.2 %  Platelet Count - Automated : 161 K/uL  Mean Cell Volume : 90.7 fL  Mean Cell Hemoglobin : 30.1 pg  Mean Cell Hemoglobin Concentration : 33.2 g/dL  Auto Neutrophil # : 4.28 K/uL  Auto Lymphocyte # : 0.91 K/uL  Auto Monocyte # : 0.38 K/uL  Auto Eosinophil # : 0.35 K/uL  Auto Basophil # : 0.04 K/uL  Auto Neutrophil % : 71.1 %  Auto Lymphocyte % : 15.1 %  Auto Monocyte % : 6.3 %  Auto Eosinophil % : 5.8 %  Auto Basophil % : 0.7 %    09-14    140  |  106  |  17  ----------------------------<  166<H>  3.6   |  26  |  1.0    Ca    10.1      14 Sep 2020 09:35  Mg     1.9     09-14    TPro  6.5  /  Alb  4.4  /  TBili  0.5  /  DBili  x   /  AST  26  /  ALT  21  /  AlkPhos  106  09-14    LIVER FUNCTIONS - ( 14 Sep 2020 09:35 )  Alb: 4.4 g/dL / Pro: 6.5 g/dL / ALK PHOS: 106 U/L / ALT: 21 U/L / AST: 26 U/L / GGT: x                   Neuroimaging:  NCHCT:   pending  09-14-20 @ 15:48

## 2020-09-14 NOTE — CONSULT NOTE ADULT - ATTENDING COMMENTS
Patient seen and examined with NP and agree with above except as noted.  Patients history, notes, labs, meds reviewed personally.  Patient no longer dizzy after medication in ED.  Has had recurrent attacks of vertigo in the past.  Exam non focal  NIHSS 0  mrankin 0    Plan as above

## 2020-09-14 NOTE — ED PROVIDER NOTE - CLINICAL SUMMARY MEDICAL DECISION MAKING FREE TEXT BOX
ADDENDUM by Annemarie Grande M.D.: I received sign-off from Dr. VALERIA Pennington. 80yoM with h/o DM, HLD, presents with vertigo since this morning, I was to f/u CTA and neuro consult. On my assessment pt confirms hx, also states now all better, no more vertigo or any other symptoms, also that identical has happened in the past due to being outside too much and inability of his skin to retain hydration due to his past radiation, and would like to go home. CTA negative, pt with no TMJ symptoms on questioning/exam. Neuro recommends discharge. NAD, well appearing, hemodynamically stable, AAOx3, CN's 3-12 intact, motor 5/5 in all extrems, nml gait/F-N/Romberg's. Patient is well appearing, NAD, afebrile, hemodynamically stable. Any available tests and studies were discussed with patient. Discharged with instructions in further symptomatic care, return precautions, and need for PMD f/u.

## 2020-09-14 NOTE — ED PROVIDER NOTE - ATTENDING CONTRIBUTION TO CARE
81 yo M pmhof Dm, prostate ca sp surgery, T cell lymphoma sp radiation therapy presents with room spinning dizziness. States that this morning started to develop gradual onset room spinning dizziness. + N/V multiple episodes, non bilious, non bloody. no headache. no similar episodes in the past. no abdominal pain. no fevers or recent illness. no chest pain, no shortness of breath, no palpitations. no fevers or recent illness. pmd is Dr. Bhat.     CONSTITUTIONAL: Well-developed; well-nourished; in no acute distress.   SKIN: warm, dry  HEAD: Normocephalic; atraumatic.  EYES: PERRL, EOMI, no conjunctival erythema. + horizontal nystagmus.   ENT: No nasal discharge; airway clear.  NECK: Supple; non tender.  CARD: S1, S2 normal;  Regular rate and rhythm.   RESP: No wheezes, rales or rhonchi.  ABD: soft non tender, non distended, no rebound or guarding  EXT: Normal ROM.  5/5 strength in all 4 extremities   LYMPH: No acute cervical adenopathy.  NEURO: A&Ox3, CN 2-11 intact, moving all extremities, 5/5 strength, equal sensation bilaterally, normal finger to nose exam.   PSYCH: Cooperative, appropriate.

## 2020-09-14 NOTE — ED PROVIDER NOTE - PROGRESS NOTE DETAILS
SHIRAZ: neuro NP Evan venegas, will see patient in the ED. SHIRAZ: patient feeling better after fluids, zofran, and meclizine. cleared by neuro pending ct results. SHIRAZ: Results discussed with patient, printed copies given. Will give neuro f/u. Patient agreeable and verbalizes understanding of plan of care, f/u, and return precautions. ADDENDUM by Annemarie Grande M.D.: I received sign-off from Dr. VALERIA Pennington. 80yoM with h/o DM, HLD, presents with vertigo since this morning, I was to f/u CTA and neuro consult. On my assessment pt confirms hx, also states now all better, no more vertigo or any other symptoms, also that identical has happened in the past due to being outside too much and inability of his skin to retain hydration due to his past radiation, and would like to go home. CTA negative, pt with no TMJ symptoms on questioning/exam. Neuro recommends discharge. NAD, well appearing, hemodynamically stable, AAOx3, CN's 3-12 intact, motor 5/5 in all extrems, nml gait/F-N/Romberg's. Patient is well appearing, NAD, afebrile, hemodynamically stable. Any available tests and studies were discussed with patient. Discharged with instructions in further symptomatic care, return precautions, and need for PMD f/u.

## 2020-09-18 NOTE — CHART NOTE - NSCHARTNOTEFT_GEN_A_CORE
PACU ANESTHESIA ADMISSION NOTE      Procedure: Open release of right carpal tunnel    Post op diagnosis:  Carpal tunnel syndrome of right wrist      ____  Intubated  TV:______       Rate: ______      FiO2: ______    __x__  Patent Airway    __x__  Full return of protective reflexes    ___x_  Full recovery from anesthesia / back to baseline     Vitals:   T:   97.6        R:   18               BP:  118/59                Sat:    97%               P: 84      Mental Status:  _x___ Awake   _____ Alert   _____ Drowsy   _____ Sedated    Nausea/Vomiting:  __x__ NO  ______Yes,   See Post - Op Orders          Pain Scale (0-10):  _____    Treatment: ____x None    ____ See Post - Op/PCA Orders    Post - Operative Fluids:   ____ Oral   ___x_ See Post - Op Orders    Plan: Discharge:   _x___Home       _____Floor     _____Critical Care    _____  Other:_________________    Comments: FAMILY HISTORY:  FH: diabetes mellitus, mother  FH: heart disease, mother

## 2020-10-26 ENCOUNTER — EMERGENCY (EMERGENCY)
Facility: HOSPITAL | Age: 80
LOS: 0 days | Discharge: LEFT BEFORE DISCHARGE | End: 2020-10-26
Attending: EMERGENCY MEDICINE | Admitting: EMERGENCY MEDICINE
Payer: MEDICARE

## 2020-10-26 VITALS
TEMPERATURE: 98 F | HEART RATE: 87 BPM | WEIGHT: 158.07 LBS | OXYGEN SATURATION: 97 % | RESPIRATION RATE: 20 BRPM | DIASTOLIC BLOOD PRESSURE: 66 MMHG | SYSTOLIC BLOOD PRESSURE: 146 MMHG

## 2020-10-26 DIAGNOSIS — K21.9 GASTRO-ESOPHAGEAL REFLUX DISEASE WITHOUT ESOPHAGITIS: ICD-10-CM

## 2020-10-26 DIAGNOSIS — Z79.4 LONG TERM (CURRENT) USE OF INSULIN: ICD-10-CM

## 2020-10-26 DIAGNOSIS — Z98.890 OTHER SPECIFIED POSTPROCEDURAL STATES: Chronic | ICD-10-CM

## 2020-10-26 DIAGNOSIS — E78.5 HYPERLIPIDEMIA, UNSPECIFIED: ICD-10-CM

## 2020-10-26 DIAGNOSIS — M54.9 DORSALGIA, UNSPECIFIED: ICD-10-CM

## 2020-10-26 DIAGNOSIS — Z90.49 ACQUIRED ABSENCE OF OTHER SPECIFIED PARTS OF DIGESTIVE TRACT: Chronic | ICD-10-CM

## 2020-10-26 DIAGNOSIS — Z91.013 ALLERGY TO SEAFOOD: ICD-10-CM

## 2020-10-26 DIAGNOSIS — Z87.891 PERSONAL HISTORY OF NICOTINE DEPENDENCE: ICD-10-CM

## 2020-10-26 DIAGNOSIS — Z90.49 ACQUIRED ABSENCE OF OTHER SPECIFIED PARTS OF DIGESTIVE TRACT: ICD-10-CM

## 2020-10-26 DIAGNOSIS — Z98.890 OTHER SPECIFIED POSTPROCEDURAL STATES: ICD-10-CM

## 2020-10-26 DIAGNOSIS — E11.9 TYPE 2 DIABETES MELLITUS WITHOUT COMPLICATIONS: ICD-10-CM

## 2020-10-26 DIAGNOSIS — M54.5 LOW BACK PAIN: ICD-10-CM

## 2020-10-26 PROCEDURE — 99283 EMERGENCY DEPT VISIT LOW MDM: CPT | Mod: GC

## 2020-10-26 RX ORDER — IBUPROFEN 200 MG
600 TABLET ORAL ONCE
Refills: 0 | Status: COMPLETED | OUTPATIENT
Start: 2020-10-26 | End: 2020-10-26

## 2020-10-26 RX ORDER — METHOCARBAMOL 500 MG/1
750 TABLET, FILM COATED ORAL ONCE
Refills: 0 | Status: COMPLETED | OUTPATIENT
Start: 2020-10-26 | End: 2020-10-26

## 2020-10-26 RX ADMIN — METHOCARBAMOL 750 MILLIGRAM(S): 500 TABLET, FILM COATED ORAL at 16:10

## 2020-10-26 RX ADMIN — Medication 600 MILLIGRAM(S): at 16:10

## 2020-10-26 NOTE — ED PROVIDER NOTE - OBJECTIVE STATEMENT
80Y M with PMH of hx of IDDM2, OA, GERD, HLD, prostate CA s/p chemo/XRT, back surgeries presents with nontraumatic acute on chronic back pain for 2 days duration. Patient reports that he was getting in his car 2 days ago, and "pulled a muscle" and since has been having back pain. Denies fevers, chills, chest pain, SOB, abdominal pain, N/V/D, numbness/tingling, focal weakness.

## 2020-10-26 NOTE — ED PROVIDER NOTE - PHYSICAL EXAMINATION
CONSTITUTIONAL: well developed, well nourished, in no acute distress, speaking in full sentences, nontoxic appearing  SKIN: warm, dry, no rash  HEAD: normocephalic, atraumatic  EYES: PERRL, EOMI, no conjunctival erythema, no nystagmus  ENT: patent airway, moist mucous membranes, no tongue deviation  NECK: supple, no masses, full flexion/extension without pain  CV:  regular rate, regular rhythm, 2+ radial pulses bilaterally  RESP: no wheezes, no rales, no rhonchi, normal work of breathing  ABD: soft, nontender, nondistended, no rebound, no guarding  MSK: normal ROM, no cyanosis, no edema, no midline spinal tenderness, tenderness over the right iliac crest  NEURO: alert, oriented, CN 2-12 grossly intact, sensation intact to light touch symmetrically, 5/5 motor strength in all extremities, no pronator drift, no facial asymmetry, normal gait  PSYCH: cooperative, appropriate

## 2020-10-26 NOTE — ED PROVIDER NOTE - PROGRESS NOTE DETAILS
According to Nurse, patient reported to feeling better and did not want to wait for paperwork and eloped. Patient not evaluated by Dr. Monaco. According to Nurse, patient reported to feeling better and did not want to wait for paperwork and eloped. Patient not evaluated by Dr. Monaco. Patient did not wait for xray.

## 2020-10-26 NOTE — ED PROVIDER NOTE - CLINICAL SUMMARY MEDICAL DECISION MAKING FREE TEXT BOX
pw low back pain, seen by resident but not seen by me. Had back pain, relieved by medications and left the ED prior to my eval.

## 2020-10-28 ENCOUNTER — APPOINTMENT (OUTPATIENT)
Dept: HEMATOLOGY ONCOLOGY | Facility: CLINIC | Age: 80
End: 2020-10-28

## 2020-10-28 ENCOUNTER — LABORATORY RESULT (OUTPATIENT)
Age: 80
End: 2020-10-28

## 2020-10-28 LAB
HCT VFR BLD CALC: 31 %
HGB BLD-MCNC: 10.7 G/DL
MCHC RBC-ENTMCNC: 30.5 PG
MCHC RBC-ENTMCNC: 34.5 G/DL
MCV RBC AUTO: 88.3 FL
PLATELET # BLD AUTO: 163 K/UL
PMV BLD: 10.1 FL
RBC # BLD: 3.51 M/UL
RBC # FLD: 12 %
WBC # FLD AUTO: 6.6 K/UL

## 2020-10-29 LAB — PSA SERPL-MCNC: <0.01 NG/ML

## 2020-10-30 ENCOUNTER — APPOINTMENT (OUTPATIENT)
Dept: HEMATOLOGY ONCOLOGY | Facility: CLINIC | Age: 80
End: 2020-10-30
Payer: MEDICARE

## 2020-10-30 VITALS
SYSTOLIC BLOOD PRESSURE: 119 MMHG | RESPIRATION RATE: 16 BRPM | HEIGHT: 67 IN | DIASTOLIC BLOOD PRESSURE: 55 MMHG | BODY MASS INDEX: 24.33 KG/M2 | WEIGHT: 155 LBS | TEMPERATURE: 97.1 F | HEART RATE: 96 BPM

## 2020-10-30 LAB
IRON SATN MFR SERPL: 31 %
IRON SERPL-MCNC: 89 UG/DL
TIBC SERPL-MCNC: 288 UG/DL
UIBC SERPL-MCNC: 199 UG/DL

## 2020-10-30 PROCEDURE — 99214 OFFICE O/P EST MOD 30 MIN: CPT

## 2020-10-30 RX ORDER — TAMSULOSIN HYDROCHLORIDE 0.4 MG/1
0.4 CAPSULE ORAL
Refills: 0 | Status: ACTIVE | COMMUNITY

## 2020-10-30 RX ORDER — METHOCARBAMOL 750 MG/1
750 TABLET, FILM COATED ORAL
Refills: 0 | Status: ACTIVE | COMMUNITY

## 2020-10-30 RX ORDER — VIT A AND D3 IN COD LIVER OIL 1250-135
100 CAPSULE ORAL
Refills: 0 | Status: ACTIVE | COMMUNITY

## 2020-10-30 RX ORDER — CHLORHEXIDINE GLUCONATE 4 %
325 (65 FE) LIQUID (ML) TOPICAL
Refills: 0 | Status: ACTIVE | COMMUNITY

## 2020-10-30 NOTE — REVIEW OF SYSTEMS
[Joint Pain] : joint pain [Muscle Pain] : muscle pain [Negative] : Constitutional [Fever] : no fever [Chills] : no chills [Night Sweats] : no night sweats [Recent Change In Weight] : ~T no recent weight change [FreeTextEntry7] : heartburn [FreeTextEntry8] : increased frequency [FreeTextEntry9] : lower back strain

## 2020-10-30 NOTE — ASSESSMENT
[FreeTextEntry1] : #Anemia: Normocytic\par --may be due to bone marrow suppression by RT; stable\par --hgb 10.7g/dL (low normal)\par --stop oral iron for now, we will recheck labwork today\par --hgb electrophoresis, iron studies, ferritin today\par - the most definite test would be bone marrow bx; if counts decrease, will obtain it ; for now, observe every 3 mos\par \par #Localized prostate Ca. s/p RT and then surgery\par --F/U with medical and radiation oncology at [facility]. He sees Dr. Ramiro Fox (URO - [phone]) + Dr. 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

## 2020-10-30 NOTE — HISTORY OF PRESENT ILLNESS
[de-identified] : 81 y/o M with localized prostate Ca s/p recent RT and brachytherapy at [facility], DM, h/o T-cell lymphoma s/p ?phototherapy at [facility] in , h/o multiple SCCs and basal cell Ca s/p excision was referred by his dermatologist Dr. Paul Mendiola for further evaluation and management of anemia. \par \par Upon review of records, his Hb was 9 with MCV 93 from 5/15/2020. WBC and plts were wnl. Another abnormality was Alk ph of 197. Pt denies having any melena, BRBPR or gross hematuria. No bleeding from any other site. No CP, SOB, palpitations, dizziness. Has some fatigue. Last colonoscopy and EGD were about 7 years back. He has h/o H.pylori and PUD. He has been on Nexium for several years. \par \par According to the pt, his [place] was 9 and he was also started on lupron and casodex. His brother  of prostate Ca. Pt's genetic evaluation was unremarkable.  [de-identified] : 6/5/2020\par Patient is here for a follow-up visit for Anemia and Prostate Cancer.  He is feeling well with no new complaints.  Most recent CBC shows slight improvement in hgb, now 10.7g/dL.  Patient denies fever, chills, palpitations, new bony pain, new LUTs or bleeding.  He states he is approximately 1 month post RT for prostate cancer at [facility].  \par \par 10/30/2020\par Patient is here for a follow-up visit for Anemia and Prostate Cancer.  He is feeling well with no new complaints.  Most recent CBC shows stable hgb at 10.7g/dL.  Patient denies fever, chills, palpitations, bony pain, new LUTs or bleeding.  He follows with [facility], he is s/p RT for prostate cancer there.  He states he takes oral iron and Vit B12 daily.  He is participating in PT for back strain.

## 2020-11-02 LAB — FERRITIN SERPL-MCNC: 121 NG/ML

## 2020-11-03 LAB
HGB A MFR BLD: 97.3 %
HGB A2 MFR BLD: 2.7 %
HGB FRACT BLD-IMP: NORMAL

## 2021-01-08 ENCOUNTER — LABORATORY RESULT (OUTPATIENT)
Age: 81
End: 2021-01-08

## 2021-01-08 ENCOUNTER — APPOINTMENT (OUTPATIENT)
Dept: HEMATOLOGY ONCOLOGY | Facility: CLINIC | Age: 81
End: 2021-01-08
Payer: MEDICARE

## 2021-01-08 ENCOUNTER — OUTPATIENT (OUTPATIENT)
Dept: OUTPATIENT SERVICES | Facility: HOSPITAL | Age: 81
LOS: 1 days | Discharge: HOME | End: 2021-01-08

## 2021-01-08 VITALS
WEIGHT: 160 LBS | RESPIRATION RATE: 14 BRPM | DIASTOLIC BLOOD PRESSURE: 74 MMHG | HEART RATE: 65 BPM | HEIGHT: 67 IN | SYSTOLIC BLOOD PRESSURE: 145 MMHG | TEMPERATURE: 97.1 F | BODY MASS INDEX: 25.11 KG/M2

## 2021-01-08 DIAGNOSIS — D64.9 ANEMIA, UNSPECIFIED: ICD-10-CM

## 2021-01-08 DIAGNOSIS — C61 MALIGNANT NEOPLASM OF PROSTATE: ICD-10-CM

## 2021-01-08 DIAGNOSIS — Z90.49 ACQUIRED ABSENCE OF OTHER SPECIFIED PARTS OF DIGESTIVE TRACT: Chronic | ICD-10-CM

## 2021-01-08 DIAGNOSIS — Z98.890 OTHER SPECIFIED POSTPROCEDURAL STATES: Chronic | ICD-10-CM

## 2021-01-08 LAB
HCT VFR BLD CALC: 32.9 %
HGB BLD-MCNC: 11.1 G/DL
MCHC RBC-ENTMCNC: 30 PG
MCHC RBC-ENTMCNC: 33.7 G/DL
MCV RBC AUTO: 88.9 FL
PLATELET # BLD AUTO: 179 K/UL
PMV BLD: 10.5 FL
RBC # BLD: 3.7 M/UL
RBC # FLD: 12 %
WBC # FLD AUTO: 5.4 K/UL

## 2021-01-08 PROCEDURE — 99213 OFFICE O/P EST LOW 20 MIN: CPT

## 2021-01-09 NOTE — REVIEW OF SYSTEMS
[Joint Pain] : joint pain [Negative] : Allergic/Immunologic [Fever] : no fever [Chills] : no chills [Recent Change In Weight] : ~T no recent weight change [Night Sweats] : no night sweats [Easy Bleeding] : no tendency for easy bleeding [FreeTextEntry7] : heartburn [FreeTextEntry8] : increased frequency

## 2021-01-09 NOTE — ASSESSMENT
[FreeTextEntry1] : #Anemia: Normocytic\par --may be due to bone marrow suppression by RT; slightly improved from last visit\par --hgb 11.1g/dL (low normal)\par --no longer taking oral iron; stores were previously adequate \par --the most definite test would be bone marrow bx; if counts decrease, will obtain it ; for now, observe every 3-6 months\par \par #Localized prostate Ca. s/p RT and then surgery\par --F/U with medical and radiation oncology at [facility]. He sees Dr. Ramiro Fox (URO - ) + Dr. Evan Bishop (RADON - [phone]) at [facility] from medical oncology\par --PSA is <0.01 from 10/2020\par --again, requested patient to obtain official records detailing his treatment from [facility] including next imaging\par \par #H/O T cell [place]: S[place]P [place] [facility] [place]\par --[place] f[place] [facility]\par \par #Elevated ALPO4\par --GGT was high from 05/2020\par --resolved\par \par Instructed patient to obtain records from [facility], especially if he wishes to transfer his care here since we are in closer proximity to his residence.\par \par RTC in 4 months with CBC, sooner if needed\par  \par in summary\par will get labs; if no improvement or worsening of anemia, will consider bone marrow bx to r/o MDS s/p RT to pelvis as a tx of prostate cancer; defer BMBX at thistime \par need to get records of prostate ca tx from [facility] still.  Requested from patient again.

## 2021-01-09 NOTE — HISTORY OF PRESENT ILLNESS
[de-identified] : 81 y/o M with localized prostate Ca s/p recent RT and brachytherapy at [facility], DM, h/o T-cell lymphoma s/p ?phototherapy at [place] in , h/o multiple SCCs and basal cell Ca s/p excision was referred by his dermatologist Dr. Paul Mendiola for further evaluation and management of anemia. \par \par Upon review of records, his Hb was 9 with MCV 93 from 5/15/2020. WBC and plts were wnl. Another abnormality was Alk ph of 197. Pt denies having any melena, BRBPR or gross hematuria. No bleeding from any other site. No CP, SOB, palpitations, dizziness. Has some fatigue. Last colonoscopy and EGD were about 7 years back. He has h/o H.pylori and PUD. He has been on Nexium for several years. \par \par According to the pt, his [place] was 9 and he was also started on lupron and casodex. His brother  of prostate Ca. Pt's genetic evaluation was unremarkable.  [de-identified] : 6/5/2020\par Patient is here for a follow-up visit for Anemia and Prostate Cancer.  He is feeling well with no new complaints.  Most recent CBC shows slight improvement in hgb, now 10.7g/dL.  Patient denies fever, chills, palpitations, new bony pain, new LUTs or bleeding.  He states he is approximately 1 month post RT for prostate cancer at [facility].  \par \par 10/30/2020\par Patient is here for a follow-up visit for Anemia and Prostate Cancer.  He is feeling well with no new complaints.  Most recent CBC shows stable hgb at 10.7g/dL.  Patient denies fever, chills, palpitations, bony pain, new LUTs or bleeding.  He follows with [facility], he is s/p RT for prostate cancer there.  He states he takes oral iron and Vit B12 daily.  He is participating in PT for back strain.   \par \par 1/8/21\par Patient is here for a follow-up visit for Anemia and Prostate Cancer.  He is feeling well with no new complaints.  Most recent CBC shows stable and mild, hgb at 11.1g/dL today.  Patient denies fever, chills, palpitations, worsening bony pain, pica, new LUTs or bleeding.  He still reports urinary frequency which is no worse.  Decreased libido but he does not view this as bothersome and we discussed this may be common amongst patients who undergo tx for prostate cancer.  He follows with [facility], he is s/p RT for prostate cancer there.  He continues with Vit B12 PO daily.  He is unsure of most recent PSA but is following with [facility] next week.  He is pending repeat Bone Scan and MR imaging next week there.

## 2021-05-03 ENCOUNTER — LABORATORY RESULT (OUTPATIENT)
Age: 81
End: 2021-05-03

## 2021-05-03 ENCOUNTER — APPOINTMENT (OUTPATIENT)
Dept: HEMATOLOGY ONCOLOGY | Facility: CLINIC | Age: 81
End: 2021-05-03

## 2021-05-03 LAB
HCT VFR BLD CALC: 33 %
HGB BLD-MCNC: 11.4 G/DL
MCHC RBC-ENTMCNC: 29.7 PG
MCHC RBC-ENTMCNC: 34.5 G/DL
MCV RBC AUTO: 85.9 FL
PLATELET # BLD AUTO: 158 K/UL
PMV BLD: 11.2 FL
RBC # BLD: 3.84 M/UL
RBC # FLD: 11.8 %
WBC # FLD AUTO: 6.44 K/UL

## 2021-05-14 ENCOUNTER — OUTPATIENT (OUTPATIENT)
Dept: OUTPATIENT SERVICES | Facility: HOSPITAL | Age: 81
LOS: 1 days | Discharge: HOME | End: 2021-05-14

## 2021-05-14 ENCOUNTER — APPOINTMENT (OUTPATIENT)
Dept: HEMATOLOGY ONCOLOGY | Facility: CLINIC | Age: 81
End: 2021-05-14
Payer: MEDICARE

## 2021-05-14 ENCOUNTER — LABORATORY RESULT (OUTPATIENT)
Age: 81
End: 2021-05-14

## 2021-05-14 VITALS
WEIGHT: 149 LBS | BODY MASS INDEX: 23.39 KG/M2 | HEART RATE: 82 BPM | TEMPERATURE: 97.8 F | SYSTOLIC BLOOD PRESSURE: 124 MMHG | DIASTOLIC BLOOD PRESSURE: 58 MMHG | HEIGHT: 67 IN | RESPIRATION RATE: 16 BRPM

## 2021-05-14 DIAGNOSIS — Z98.890 OTHER SPECIFIED POSTPROCEDURAL STATES: Chronic | ICD-10-CM

## 2021-05-14 DIAGNOSIS — Z90.49 ACQUIRED ABSENCE OF OTHER SPECIFIED PARTS OF DIGESTIVE TRACT: Chronic | ICD-10-CM

## 2021-05-14 LAB
HCT VFR BLD CALC: 32.5 %
HGB BLD-MCNC: 11 G/DL
MCHC RBC-ENTMCNC: 30 PG
MCHC RBC-ENTMCNC: 33.8 G/DL
MCV RBC AUTO: 88.6 FL
PLATELET # BLD AUTO: 165 K/UL
PMV BLD: 9.9 FL
RBC # BLD: 3.67 M/UL
RBC # FLD: 11.7 %
WBC # FLD AUTO: 6.37 K/UL

## 2021-05-14 PROCEDURE — 99212 OFFICE O/P EST SF 10 MIN: CPT

## 2021-05-14 NOTE — ASSESSMENT
[FreeTextEntry1] : #Anemia: Normocytic\par --may be due to bone marrow suppression by RT; slightly improved from last visit\par --hgb 11.1g/dL (low normal)\par --no longer taking oral iron; stores were previously adequate \par --the most definite test would be bone marrow bx; if counts decrease, will obtain it ; for now, observe every 3-6 months\par \par #Localized prostate Ca. s/p RT and then surgery\par --F/U with medical and radiation oncology at [facility]. He sees Dr. Ramiro Fox (URO - ) + Dr. Evan Bishop (RADON - [phone]) at [facility] from medical oncology\par --PSA is <0.01 from 10/2020\par --again, requested patient to obtain official records detailing his treatment from [facility] including next imaging\par \par #H/O T cell [place]: [place] [facility] [place]\par --[place] [facility]\par \par #Elevated ALPO4\par --GGT was high from 05/2020\par --resolved\par \par Instructed patient to obtain records from [facility], especially if he wishes to transfer his care here since we are in closer proximity to his residence.\par \par RTC in 4 months with CBC, sooner if needed\par  \par in summary\par will get labs; if no improvement or worsening of anemia, will consider bone marrow bx to r/o MDS s/p RT to pelvis as a tx of prostate cancer; defer BMBX at thistime \par need to get records of prostate ca tx from [facility] still.  Requested from patient again.\par \par \par 5/14/2021 \par mild anemia is stable, pt does not want bone marrow biopsy,\par last PSA 10/2020 <0.01 \par psa to be done today\par will continue to monitor weight if no weight loss will f/up in 6mo with labs psa, cbc, bmp, lft's \par pt told to f/up with Dr Castrejon was being followed by Dr Artis but not able to get an appt.  \par Case was seen and discussed with Dr. Barrera who agreed with the assessment and plan.\par

## 2021-05-14 NOTE — REVIEW OF SYSTEMS
[Joint Pain] : joint pain [Negative] : Allergic/Immunologic [Fever] : no fever [Chills] : no chills [Night Sweats] : no night sweats [Recent Change In Weight] : ~T no recent weight change [Easy Bleeding] : no tendency for easy bleeding [FreeTextEntry7] : heartburn [FreeTextEntry8] : increased frequency

## 2021-05-14 NOTE — HISTORY OF PRESENT ILLNESS
[de-identified] : 81 y/o M with localized prostate Ca s/p recent RT and brachytherapy at [facility], DM, h/o T-cell [place] s[place] [facility] [place] h/o multiple SCCs and basal cell Ca s/p excision was referred by his dermatologist Dr. Paul Mendiola for further evaluation and management of anemia. \par \par Upon review of records, his Hb was 9 with MCV 93 from 5/15/2020. WBC and plts were wnl. Another abnormality was Alk ph of 197. Pt denies having any melena, BRBPR or gross hematuria. No bleeding from any other site. No CP, SOB, palpitations, dizziness. Has some fatigue. Last colonoscopy and EGD were about 7 years back. He has h/o H.pylori and PUD. He has been on Nexium for several years. \par \par According to the pt, his [place] was 9 and he was also started on lupron and casodex. His brother  of prostate Ca. Pt's genetic evaluation was unremarkable.  [de-identified] : 6/5/2020\[facility] Patient is here for a follow-up visit for Anemia and Prostate Cancer.  He is feeling well with no new complaints.  Most recent CBC shows slight improvement in hgb, now 10.7g/dL.  Patient denies fever, chills, palpitations, new bony pain, new LUTs or bleeding.  He states he is approximately 1 month post RT for prostate cancer at [facility].  \par \par 10/30/2020\[facility] Patient is here for a follow-up visit for Anemia and Prostate Cancer.  He is feeling well with no new complaints.  Most recent CBC shows stable hgb at 10.7g/dL.  Patient denies fever, chills, palpitations, bony pain, new LUTs or bleeding.  He follows with [facility], he is s/p RT for prostate cancer there.  He states he takes oral iron and Vit B12 daily.  He is participating in PT for back strain.   \par \par 1/8/21\[facility] Patient is here for a follow-up visit for Anemia and Prostate Cancer.  He is feeling well with no new complaints.  Most recent CBC shows stable and mild, hgb at 11.1g/dL today.  Patient denies fever, chills, palpitations, worsening bony pain, pica, new LUTs or bleeding.  He still reports urinary frequency which is no worse.  Decreased libido but he does not view this as bothersome and we discussed this may be common amongst patients who undergo tx for prostate cancer.  He follows with [facility], he is s/p RT for prostate cancer there.  He continues with Vit B12 PO daily.  He is unsure of most recent PSA but is following with [facility] next week.  He is pending repeat Bone Scan and MR imaging next week there.  \par \par 5/14/21\[facility] Patient is here for a follow-up visit for Anemia and Prostate Cancer.  He is feeling well with no new complaints. Pt noticed a 10lb weight loss was concerned. Pt had joined gym at the time of the weight loss, pt has since started eating more and noticed weight loss stopped and he gained back 4lb. He continues with urinary frequency.  Otherwise feels well. no abdominal pain no n/v no fever no chills.  Pt Has not been back to [facility] was told no more f/up needed.

## 2021-05-18 LAB
PSA SERPL-MCNC: <0.01 NG/ML
PSA SERPL-MCNC: <0.01 NG/ML

## 2021-05-19 DIAGNOSIS — D64.9 ANEMIA, UNSPECIFIED: ICD-10-CM

## 2021-05-19 DIAGNOSIS — C61 MALIGNANT NEOPLASM OF PROSTATE: ICD-10-CM

## 2021-06-03 ENCOUNTER — EMERGENCY (EMERGENCY)
Facility: HOSPITAL | Age: 81
LOS: 0 days | Discharge: HOME | End: 2021-06-03
Attending: EMERGENCY MEDICINE | Admitting: EMERGENCY MEDICINE
Payer: MEDICARE

## 2021-06-03 VITALS
RESPIRATION RATE: 18 BRPM | HEART RATE: 82 BPM | TEMPERATURE: 99 F | WEIGHT: 143.3 LBS | DIASTOLIC BLOOD PRESSURE: 72 MMHG | HEIGHT: 68 IN | SYSTOLIC BLOOD PRESSURE: 162 MMHG | OXYGEN SATURATION: 99 %

## 2021-06-03 DIAGNOSIS — Z79.84 LONG TERM (CURRENT) USE OF ORAL HYPOGLYCEMIC DRUGS: ICD-10-CM

## 2021-06-03 DIAGNOSIS — Z85.72 PERSONAL HISTORY OF NON-HODGKIN LYMPHOMAS: ICD-10-CM

## 2021-06-03 DIAGNOSIS — Z91.041 RADIOGRAPHIC DYE ALLERGY STATUS: ICD-10-CM

## 2021-06-03 DIAGNOSIS — R35.0 FREQUENCY OF MICTURITION: ICD-10-CM

## 2021-06-03 DIAGNOSIS — Z91.013 ALLERGY TO SEAFOOD: ICD-10-CM

## 2021-06-03 DIAGNOSIS — Z98.890 OTHER SPECIFIED POSTPROCEDURAL STATES: Chronic | ICD-10-CM

## 2021-06-03 DIAGNOSIS — Z85.46 PERSONAL HISTORY OF MALIGNANT NEOPLASM OF PROSTATE: ICD-10-CM

## 2021-06-03 DIAGNOSIS — R73.9 HYPERGLYCEMIA, UNSPECIFIED: ICD-10-CM

## 2021-06-03 DIAGNOSIS — R74.8 ABNORMAL LEVELS OF OTHER SERUM ENZYMES: ICD-10-CM

## 2021-06-03 DIAGNOSIS — R63.4 ABNORMAL WEIGHT LOSS: ICD-10-CM

## 2021-06-03 DIAGNOSIS — E11.65 TYPE 2 DIABETES MELLITUS WITH HYPERGLYCEMIA: ICD-10-CM

## 2021-06-03 DIAGNOSIS — Z90.49 ACQUIRED ABSENCE OF OTHER SPECIFIED PARTS OF DIGESTIVE TRACT: Chronic | ICD-10-CM

## 2021-06-03 DIAGNOSIS — Z90.49 ACQUIRED ABSENCE OF OTHER SPECIFIED PARTS OF DIGESTIVE TRACT: ICD-10-CM

## 2021-06-03 DIAGNOSIS — Z87.891 PERSONAL HISTORY OF NICOTINE DEPENDENCE: ICD-10-CM

## 2021-06-03 DIAGNOSIS — Z87.39 PERSONAL HISTORY OF OTHER DISEASES OF THE MUSCULOSKELETAL SYSTEM AND CONNECTIVE TISSUE: ICD-10-CM

## 2021-06-03 DIAGNOSIS — K21.9 GASTRO-ESOPHAGEAL REFLUX DISEASE WITHOUT ESOPHAGITIS: ICD-10-CM

## 2021-06-03 DIAGNOSIS — Z79.899 OTHER LONG TERM (CURRENT) DRUG THERAPY: ICD-10-CM

## 2021-06-03 LAB
ALBUMIN SERPL ELPH-MCNC: 4.7 G/DL — SIGNIFICANT CHANGE UP (ref 3.5–5.2)
ALP SERPL-CCNC: 125 U/L — HIGH (ref 30–115)
ALT FLD-CCNC: 25 U/L — SIGNIFICANT CHANGE UP (ref 0–41)
ANION GAP SERPL CALC-SCNC: 11 MMOL/L — SIGNIFICANT CHANGE UP (ref 7–14)
AST SERPL-CCNC: 25 U/L — SIGNIFICANT CHANGE UP (ref 0–41)
B-OH-BUTYR SERPL-SCNC: 0.4 MMOL/L — SIGNIFICANT CHANGE UP
BILIRUB SERPL-MCNC: 0.4 MG/DL — SIGNIFICANT CHANGE UP (ref 0.2–1.2)
BUN SERPL-MCNC: 18 MG/DL — SIGNIFICANT CHANGE UP (ref 10–20)
CALCIUM SERPL-MCNC: 10.3 MG/DL — HIGH (ref 8.5–10.1)
CHLORIDE SERPL-SCNC: 95 MMOL/L — LOW (ref 98–110)
CO2 SERPL-SCNC: 27 MMOL/L — SIGNIFICANT CHANGE UP (ref 17–32)
CREAT SERPL-MCNC: 1.1 MG/DL — SIGNIFICANT CHANGE UP (ref 0.7–1.5)
GLUCOSE SERPL-MCNC: 439 MG/DL — HIGH (ref 70–99)
HCT VFR BLD CALC: 33.8 % — LOW (ref 42–52)
HGB BLD-MCNC: 11.6 G/DL — LOW (ref 14–18)
MCHC RBC-ENTMCNC: 28.7 PG — SIGNIFICANT CHANGE UP (ref 27–31)
MCHC RBC-ENTMCNC: 34.3 G/DL — SIGNIFICANT CHANGE UP (ref 32–37)
MCV RBC AUTO: 83.7 FL — SIGNIFICANT CHANGE UP (ref 80–94)
NRBC # BLD: 0 /100 WBCS — SIGNIFICANT CHANGE UP (ref 0–0)
PLATELET # BLD AUTO: 194 K/UL — SIGNIFICANT CHANGE UP (ref 130–400)
POTASSIUM SERPL-MCNC: 3.9 MMOL/L — SIGNIFICANT CHANGE UP (ref 3.5–5)
POTASSIUM SERPL-SCNC: 3.9 MMOL/L — SIGNIFICANT CHANGE UP (ref 3.5–5)
PROT SERPL-MCNC: 6.7 G/DL — SIGNIFICANT CHANGE UP (ref 6–8)
RBC # BLD: 4.04 M/UL — LOW (ref 4.7–6.1)
RBC # FLD: 11.9 % — SIGNIFICANT CHANGE UP (ref 11.5–14.5)
SODIUM SERPL-SCNC: 133 MMOL/L — LOW (ref 135–146)
WBC # BLD: 6.12 K/UL — SIGNIFICANT CHANGE UP (ref 4.8–10.8)
WBC # FLD AUTO: 6.12 K/UL — SIGNIFICANT CHANGE UP (ref 4.8–10.8)

## 2021-06-03 PROCEDURE — 99284 EMERGENCY DEPT VISIT MOD MDM: CPT

## 2021-06-03 NOTE — ED PROVIDER NOTE - CLINICAL SUMMARY MEDICAL DECISION MAKING FREE TEXT BOX
hyperglycemia/poorly controlled DM/wt loss.  labs, imaging, hydration, insulin, supportive care, admit for further w/u. hyperglycemia/poorly controlled DM/wt loss.  labs, imaging, hydration, insulin, supportive care, outpt rapid clinic f/u.

## 2021-06-03 NOTE — ED PROVIDER NOTE - NSFOLLOWUPCLINICS_GEN_ALL_ED_FT
[facility]  Medicine  [address], [place]   Phone: [phone]  Fax:

## 2021-06-03 NOTE — ED PROVIDER NOTE - NS ED ROS FT
Review of Systems:  CONSTITUTIONAL - No fever  SKIN - No rash  HEMATOLOGIC - No abnormal bleeding or bruising  RESPIRATORY - No shortness of breath, No cough  CARDIAC -No chest pain, No palpitations  GI - No abdominal pain, No nausea, No vomiting, No diarrhea  - No dysuria, frequency, hematuria  MUSCULOSKELETAL - No joint paint, No swelling, No back pain  NEUROLOGIC - No numbness, No focal weakness, No headache, No dizziness  All other systems negative, unless specified in HPI

## 2021-06-03 NOTE — ED PROVIDER NOTE - ATTENDING CONTRIBUTION TO CARE
HR=78 bpm, OQIM=929/67 mmhg, SpO2=96.0 %, Resp=20 B/min, EtCO2=32 mmHg, Apnea=3 Seconds, Perez=2 pt co 20 lbs wt loss over 2 months, despite increasing caloric intake. 3 meals a day. sts urinating a lot. feels gen weak. no cp, sob, ab pain, n, v, d. no fever or chills. no dark/black/thin stools. has remote hx of prostate ca that was treated and in remission.     pt in nad, cta, rrr, ab soft, nt, nd, no masses. skin nml. no focal def.

## 2021-06-03 NOTE — ED PROVIDER NOTE - PATIENT PORTAL LINK FT
You can access the FollowMyHealth Patient Portal offered by [facility] by registering at the following website: [email or web address]. By joining [employer]’s FollowMyHealth portal, you will also be able to view your health information using other applications (apps) compatible with our system.

## 2021-06-03 NOTE — ED PROVIDER NOTE - NSFOLLOWUPINSTRUCTIONS_ED_ALL_ED_FT
Diabetic Hyperglycemia    WHAT YOU NEED TO KNOW:    Diabetic hyperglycemia is a blood glucose (sugar) level that is higher than your diabetes care team provider recommends. You may have increased thirst and urinate more often than usual.     DISCHARGE INSTRUCTIONS:    Call your local emergency number (911 in the [place]) for any of the following:   •You have a seizure.       •You begin to breathe fast or are short of breath.       •You become weak and confused.       Seek care immediately if:   •Your blood sugar level is over 240 mg/dL and you have ketones in your urine.      •Your breath smells fruity.       •You have nausea and are vomiting.       •You have symptoms of dehydration, such as dark yellow urine, dry mouth and lips, and dry skin.       Call your care team provider if:   •You continue to have higher blood sugar levels than your care team provider recommends.      •You have questions or concerns about your condition or care.       Medicines:   •Medicines, such as insulin and diabetes pills, decrease blood sugar levels.       •Take your medicine as directed. Contact your healthcare provider if you think your medicine is not helping or if you have side effects. Tell him or her if you are allergic to any medicine. Keep a list of the medicines, vitamins, and herbs you take. Include the amounts, and when and why you take them. Bring the list or the pill bottles to follow-up visits. Carry your medicine list with you in case of an emergency.      Manage diabetic hyperglycemia:   •If you take diabetes medicine or insulin, take it as directed. Missed or wrong doses can cause your blood sugar to go up.      •Tell your care team provider if you continue to have trouble managing your blood sugar. He or she may change the type, amount, or timing of your diabetes medicine or insulin. If you do not take diabetes medicine or insulin, you may need to start.       •Work with your care team provider to develop a sick day plan. Illness can cause your blood sugar to rise. A sick day plan helps you control your blood sugar level when you are sick.       Prevent diabetic hyperglycemia:   •Check your blood sugar levels regularly. Ask your care team provider how often to check your blood sugar and what your levels should be.       •Follow your meal plan. Your blood sugar can go up if you eat a large meal or you eat more carbohydrates than recommended. Work with a dietitian to develop a meal plan that is right for you.       •Exercise as directed. Physical activity, such as exercise, can help lower your blood sugar when it is high. It can also keep your blood sugar levels steady over time. Be active for at least 30 minutes, 5 days a week. Include muscle strengthening activities 2 days each week. Do not sit for longer than 30 minutes at a time. Work with your care team provider to create an activity plan. Children should get at least 60 minutes of physical activity each day.       •Check your ketones before exercise if your blood sugar level is above 240 mg/dL. Do not exercise if you have ketones in your urine because your blood sugar level may rise even more. Ask your healthcare provider how to lower your blood sugar when you have ketones.       Follow up with your care team provider as directed: Your care team provider may refer you to a dietitian. He or she can help you manage your blood sugar levels. Write down your questions so you remember to ask them during your visits.

## 2021-06-03 NOTE — ED ADULT TRIAGE NOTE - BMI (KG/M2)
21.8 Digital Medicine: Clinician Follow-Up    Patient returned my call to discuss his at goal readings.    The history is provided by the patient.   Follow-up reason(s): routine follow up.     Hypertension    Patient readings are stable   Patient is not experiencing signs/symptoms of hypotension.  Patient is not experiencing signs/symptoms of hypertension.          Last 5 Patient Entered Readings                                      Current 30 Day Average: 122/72     Recent Readings 9/20/2020 9/20/2020 9/16/2020 9/16/2020 9/16/2020    SBP (mmHg) 109 104 125 136 115    DBP (mmHg) 66 70 72 81 68    Pulse 62 61 60 60 61                 Depression Screening  Peter Caldwell screened positive on the depression screening. He is not in treatment for depression and is not interested in a referral.   Patient mentioned being in isolation has left him feeling down. We discussed what work-up could be done and that could lead to medication or a referral to psychiatry to discuss these feeling with someone. He did not want a referral to his PCP or psych at this time.            Medication Adherence-Medication adherence was assessed.          ASSESSMENT(S)  Patients BP average is 122/72 mmHg, which is at goal. Patient's BP goal is less than or equal to 130/80 per 2017 ACC/AHA Hypertension Guidelines.    Hypertension Plan  Continue current therapy.  Provided patient education. Discusssed hypotension symptoms and informed patient to reach out if any symptoms develop prior to my next outreach. Patient is aware to reach out to me or Dr. Cardoso for further depession work-up.       Addressed patient questions and patient has my contact information if needed prior to next outreach. Patient verbalizes understanding.            There are no preventive care reminders to display for this patient.  There are no preventive care reminders to display for this patient.    Hypertension Medications             metoprolol succinate (TOPROL-XL) 50 MG 24  hr tablet Take 1 tablet (50 mg total) by mouth every evening.

## 2021-06-03 NOTE — ED ADULT NURSE NOTE - OBJECTIVE STATEMENT
pt complains of urinary frequency today, and unintentional weight loss over last few months, pt denies fever, n/v/d, cp, sob, cough, rash

## 2021-06-03 NOTE — ED PROVIDER NOTE - CARE PROVIDER_API CALL
Jose Oglesby  GASTROENTEROLOGY  [address] [place] [id number]  Phone: [phone]  Fax: [phone]  Follow Up Time:     Gage Medina  ENDOCRINOLOGY/METAB/DIABETES  [address] [place] [address]  Phone: [phone]  Fax: [phone]  Follow Up Time:

## 2021-06-03 NOTE — ED PROVIDER NOTE - PROGRESS NOTE DETAILS
PP: Patient has hyperglycemia and elevated Alk phos, could have underlying malignancy and patient explained this possibility, and advised outpatient follow up for further workup with primary. Patient agrees with plan.

## 2021-06-03 NOTE — ED PROVIDER NOTE - OBJECTIVE STATEMENT
81Y M with PMH of prostate CA s/p chemp and radiation last november, IDDM, GERD presents with CC of weight loss. Patient reports that he lost about 20 lbs in 2 months and has become very concerned about this and wanted to be evaluated in the ED. Patient also notes that has been urinating more frequently. Denies fevers, chills, chest pain, SOB, N/V/D.

## 2021-06-03 NOTE — ED ADULT TRIAGE NOTE - CHIEF COMPLAINT QUOTE
I'm losing weight rapidly, two month ago I was 170 lbs, something is going on. I was at [facility], I had prostate CA, my brother  from it. Yesterday and part of today I've been peeing every hour - patient I'm losing weight rapidly, two month ago I was 170 lbs, something is going on. I was at [facility], I had prostate CA, my brother  from it. Yesterday and part of today I've been peeing every hour - patient  Patient reports his blood glucose has been persistently above 400 x one month

## 2021-06-03 NOTE — ED ADULT NURSE NOTE - CHIEF COMPLAINT QUOTE
I'm losing weight rapidly, two month ago I was 170 lbs, something is going on. I was at [facility], I had prostate CA, my brother  from it. Yesterday and part of today I've been peeing every hour - patient  Patient reports his blood glucose has been persistently above 400 x one month

## 2021-07-12 ENCOUNTER — APPOINTMENT (OUTPATIENT)
Dept: INTERNAL MEDICINE | Facility: CLINIC | Age: 81
End: 2021-07-12

## 2021-11-10 ENCOUNTER — LABORATORY RESULT (OUTPATIENT)
Age: 81
End: 2021-11-10

## 2021-11-10 ENCOUNTER — APPOINTMENT (OUTPATIENT)
Dept: HEMATOLOGY ONCOLOGY | Facility: CLINIC | Age: 81
End: 2021-11-10

## 2021-11-10 LAB
HCT VFR BLD CALC: 32.1 %
HGB BLD-MCNC: 10.7 G/DL
MCHC RBC-ENTMCNC: 28.6 PG
MCHC RBC-ENTMCNC: 33.3 G/DL
MCV RBC AUTO: 85.8 FL
PLATELET # BLD AUTO: 173 K/UL
PMV BLD: 10.3 FL
RBC # BLD: 3.74 M/UL
RBC # FLD: 13 %
WBC # FLD AUTO: 7.14 K/UL

## 2021-11-11 LAB
ALBUMIN SERPL ELPH-MCNC: 4.4 G/DL
ALP BLD-CCNC: 129 U/L
ALT SERPL-CCNC: 19 U/L
ANION GAP SERPL CALC-SCNC: 14 MMOL/L
AST SERPL-CCNC: 21 U/L
BILIRUB DIRECT SERPL-MCNC: <0.2 MG/DL
BILIRUB INDIRECT SERPL-MCNC: >0.1 MG/DL
BILIRUB SERPL-MCNC: 0.3 MG/DL
BUN SERPL-MCNC: 24 MG/DL
CALCIUM SERPL-MCNC: 9.9 MG/DL
CHLORIDE SERPL-SCNC: 102 MMOL/L
CO2 SERPL-SCNC: 22 MMOL/L
CREAT SERPL-MCNC: 1.1 MG/DL
GLUCOSE SERPL-MCNC: 203 MG/DL
POTASSIUM SERPL-SCNC: 4.9 MMOL/L
PROT SERPL-MCNC: 6.8 G/DL
PSA SERPL-MCNC: <0.01 NG/ML
SODIUM SERPL-SCNC: 138 MMOL/L

## 2021-11-24 ENCOUNTER — APPOINTMENT (OUTPATIENT)
Dept: HEMATOLOGY ONCOLOGY | Facility: CLINIC | Age: 81
End: 2021-11-24
Payer: MEDICARE

## 2021-11-24 VITALS
DIASTOLIC BLOOD PRESSURE: 66 MMHG | WEIGHT: 150 LBS | HEART RATE: 89 BPM | HEIGHT: 67 IN | BODY MASS INDEX: 23.54 KG/M2 | SYSTOLIC BLOOD PRESSURE: 123 MMHG | TEMPERATURE: 97.6 F

## 2021-11-24 PROCEDURE — 99214 OFFICE O/P EST MOD 30 MIN: CPT

## 2021-11-24 NOTE — ASSESSMENT
[FreeTextEntry1] : #Anemia: Normocytic\par --may be due to bone marrow suppression by RT; slightly improved from last visit\par --hgb 11.1g/dL (low normal)\par --no longer taking oral iron; stores were previously adequate \par --the most definite test would be bone marrow bx; if counts decrease, will obtain it ; for now, observe every 3-6 months\par \par #Localized prostate Ca. s/p RT and then surgery\par --F/U with medical and radiation oncology at [facility]. He sees Dr. Ramiro Fox (URO - ) + Dr. Evan Bishop (RADON - [phone]) at [facility] from medical oncology\par --PSA is <0.01 from 10/2020\par --again, requested patient to obtain official records detailing his treatment from [facility] including next imaging\par \par #H/O T cell lymphoma: S/P ?phototherapy at [facility] in 2005\par --Continue f/u at [facility]\par \par #Elevated ALPO4\par --GGT was high from 05/2020\par --resolved\par \par Instructed patient to obtain records from [facility], especially if he wishes to transfer his care here since we are in closer proximity to his residence.\par \par RTC in 4 months with CBC, sooner if needed\par  \par in summary\par will get labs; if no improvement or worsening of anemia, will consider bone marrow bx to r/o MDS s/p RT to pelvis as a tx of prostate cancer; defer BMBX at thistime \par need to get records of prostate ca tx from [facility] still.  Requested from patient again.\par \par f/u in [facility] re; NHL Tcell lymphoma\par \par mild anemia is stable, pt does not want bone marrow biopsy,\par psa is normal : needs to have f/u in [facility] with his [facility]\par \par \par will continue to monitor weight if no weight loss will f/up in 6mo with labs psa, cbc, bmp, lft's \par pt told to f/up with Dr Castrejon was being followed by Dr Artis but not able to get an appt.  \par \par

## 2021-11-24 NOTE — HISTORY OF PRESENT ILLNESS
[de-identified] : 6/5/2020\[facility] Patient is here for a follow-up visit for Anemia and Prostate Cancer.  He is feeling well with no new complaints.  Most recent CBC shows slight improvement in hgb, now 10.7g/dL.  Patient denies fever, chills, palpitations, new bony pain, new LUTs or bleeding.  He states he is approximately 1 month post RT for prostate cancer at [facility].  \par \par 10/30/2020\[facility] Patient is here for a follow-up visit for Anemia and Prostate Cancer.  He is feeling well with no new complaints.  Most recent CBC shows stable hgb at 10.7g/dL.  Patient denies fever, chills, palpitations, bony pain, new LUTs or bleeding.  He follows with [facility], he is s/p RT for prostate cancer there.  He states he takes oral iron and Vit B12 daily.  He is participating in PT for back strain.   \par \par 1/8/21\[facility] Patient is here for a follow-up visit for Anemia and Prostate Cancer.  He is feeling well with no new complaints.  Most recent CBC shows stable and mild, hgb at 11.1g/dL today.  Patient denies fever, chills, palpitations, worsening bony pain, pica, new LUTs or bleeding.  He still reports urinary frequency which is no worse.  Decreased libido but he does not view this as bothersome and we discussed this may be common amongst patients who undergo tx for prostate cancer.  He follows with [facility], he is s/p RT for prostate cancer there.  He continues with Vit B12 PO daily.  He is unsure of most recent PSA but is following with [facility] next week.  He is pending repeat Bone Scan and MR imaging next week there.  \par \par 5/14/21\[facility] Patient is here for a follow-up visit for Anemia and Prostate Cancer.  He is feeling well with no new complaints. Pt noticed a 10lb weight loss was concerned. Pt had joined gym at the time of the weight loss, pt has since started eating more and noticed weight loss stopped and he gained back 4lb. He continues with urinary frequency.  Otherwise feels well. no abdominal pain no n/v no fever no chills.  Pt Has not been back to [facility] was told no more f/up needed.   [de-identified] : 79 y/o M with localized prostate Ca s/p recent RT and brachytherapy at [facility], DM, h/o T-cell lymphoma s/p ?phototherapy at [facility] in , h/o multiple SCCs and basal cell Ca s/p excision was referred by his dermatologist Dr. Paul Mendiola for further evaluation and management of anemia. \par \par Upon review of records, his Hb was 9 with MCV 93 from 5/15/2020. WBC and plts were wnl. Another abnormality was Alk ph of 197. Pt denies having any melena, BRBPR or gross hematuria. No bleeding from any other site. No CP, SOB, palpitations, dizziness. Has some fatigue. Last colonoscopy and EGD were about 7 years back. He has h/o H.pylori and PUD. He has been on Nexium for several years. \par \par According to the pt, his [place] was 9 and he was also started on lupron and casodex. His brother  of prostate Ca. Pt's genetic evaluation was unremarkable.

## 2021-12-27 ENCOUNTER — APPOINTMENT (OUTPATIENT)
Dept: HEMATOLOGY ONCOLOGY | Facility: CLINIC | Age: 81
End: 2021-12-27

## 2022-01-04 ENCOUNTER — LABORATORY RESULT (OUTPATIENT)
Age: 82
End: 2022-01-04

## 2022-01-04 ENCOUNTER — APPOINTMENT (OUTPATIENT)
Dept: HEMATOLOGY ONCOLOGY | Facility: CLINIC | Age: 82
End: 2022-01-04

## 2022-01-04 ENCOUNTER — OUTPATIENT (OUTPATIENT)
Dept: OUTPATIENT SERVICES | Facility: HOSPITAL | Age: 82
LOS: 1 days | Discharge: HOME | End: 2022-01-04

## 2022-01-04 DIAGNOSIS — Z98.890 OTHER SPECIFIED POSTPROCEDURAL STATES: Chronic | ICD-10-CM

## 2022-01-04 DIAGNOSIS — D64.9 ANEMIA, UNSPECIFIED: ICD-10-CM

## 2022-01-04 DIAGNOSIS — C61 MALIGNANT NEOPLASM OF PROSTATE: ICD-10-CM

## 2022-01-04 DIAGNOSIS — Z90.49 ACQUIRED ABSENCE OF OTHER SPECIFIED PARTS OF DIGESTIVE TRACT: Chronic | ICD-10-CM

## 2022-01-05 ENCOUNTER — APPOINTMENT (OUTPATIENT)
Dept: HEMATOLOGY ONCOLOGY | Facility: CLINIC | Age: 82
End: 2022-01-05
Payer: MEDICARE

## 2022-01-05 ENCOUNTER — OUTPATIENT (OUTPATIENT)
Dept: OUTPATIENT SERVICES | Facility: HOSPITAL | Age: 82
LOS: 1 days | Discharge: HOME | End: 2022-01-05

## 2022-01-05 DIAGNOSIS — D64.9 ANEMIA, UNSPECIFIED: ICD-10-CM

## 2022-01-05 DIAGNOSIS — Z98.890 OTHER SPECIFIED POSTPROCEDURAL STATES: Chronic | ICD-10-CM

## 2022-01-05 DIAGNOSIS — Z90.49 ACQUIRED ABSENCE OF OTHER SPECIFIED PARTS OF DIGESTIVE TRACT: Chronic | ICD-10-CM

## 2022-01-05 DIAGNOSIS — C61 MALIGNANT NEOPLASM OF PROSTATE: ICD-10-CM

## 2022-01-05 LAB
ALBUMIN SERPL ELPH-MCNC: 4.4 G/DL
ALP BLD-CCNC: 137 U/L
ALT SERPL-CCNC: 14 U/L
ANION GAP SERPL CALC-SCNC: 17 MMOL/L
AST SERPL-CCNC: 19 U/L
BILIRUB DIRECT SERPL-MCNC: <0.2 MG/DL
BILIRUB INDIRECT SERPL-MCNC: >0.1 MG/DL
BILIRUB SERPL-MCNC: 0.3 MG/DL
BUN SERPL-MCNC: 25 MG/DL
CALCIUM SERPL-MCNC: 9.7 MG/DL
CHLORIDE SERPL-SCNC: 99 MMOL/L
CO2 SERPL-SCNC: 21 MMOL/L
CREAT SERPL-MCNC: 1 MG/DL
FERRITIN SERPL-MCNC: 106 NG/ML
GLUCOSE SERPL-MCNC: 240 MG/DL
HCT VFR BLD CALC: 33.9 %
HGB BLD-MCNC: 11.1 G/DL
IRON SATN MFR SERPL: 27 %
IRON SERPL-MCNC: 89 UG/DL
MCHC RBC-ENTMCNC: 28.2 PG
MCHC RBC-ENTMCNC: 32.7 G/DL
MCV RBC AUTO: 86 FL
PLATELET # BLD AUTO: 181 K/UL
PMV BLD: 10.3 FL
POTASSIUM SERPL-SCNC: 4.8 MMOL/L
PROT SERPL-MCNC: 6.6 G/DL
PSA SERPL-MCNC: <0.01 NG/ML
RBC # BLD: 3.94 M/UL
RBC # FLD: 13.1 %
SODIUM SERPL-SCNC: 137 MMOL/L
TIBC SERPL-MCNC: 327 UG/DL
UIBC SERPL-MCNC: 238 UG/DL
WBC # FLD AUTO: 6.68 K/UL

## 2022-01-05 PROCEDURE — 99213 OFFICE O/P EST LOW 20 MIN: CPT | Mod: 95

## 2022-01-05 NOTE — REASON FOR VISIT
[Follow-Up Visit] : a follow-up visit for [Home] : at home, [unfilled] , at the time of the visit. [Medical Office: ([facility])___] : at the medical office located in  [Verbal consent obtained from patient] : the patient, [unfilled] [FreeTextEntry2] : Anemia

## 2022-01-05 NOTE — ASSESSMENT
[FreeTextEntry1] : #Anemia: Normocytic\par --may be due to bone marrow suppression by RT; slightly improved from last visit\par --hgb 11.1g/dL (01/2022)\par --no longer taking oral iron; stores were previously adequate \par --the most definite test would be bone marrow bx; if counts decrease, will obtain it ; for now, observe every 3-6 months\par \par #Localized prostate Ca. s/p RT and then surgery\par --F/U with medical and radiation oncology at [facility]. He sees Dr. Ramiro Fox (URO - [phone]) + Dr. Evan Bishop (RADON - [phone]) at [facility] from medical oncology\par --PSA is <0.01 from 01/2022\par --again, requested patient to obtain official records detailing his treatment from [facility] including next imaging\par \par #H/O T cell lymphoma: S/P ?phototherapy at [facility] in 2005\par --Continue f/u at [facility]\par \par #Elevated ALPO4\par --GGT was high from 05/2020\par --resolved\par \par Instructed patient to obtain records from [facility], especially if he wishes to transfer his care here since we are in closer proximity to his residence.\par \par RTC in 4 months with CBC, sooner if needed\par  \par in summary\par will get labs; if no improvement or worsening of anemia, will consider bone marrow bx to r/o MDS s/p RT to pelvis as a tx of prostate cancer; defer BMBX at thistime \par need to get records of prostate ca tx from [facility] still.  Requested from patient again.\par \par f/u in [facility] re; NHL Tcell lymphoma\par \par mild anemia is stable, pt does not want bone marrow biopsy,\par psa is normal : needs to have f/u in [facility] with his [facility]\par \par \par will continue to monitor weight if no weight loss will f/up in 6mo with labs psa, cbc, bmp, lft's \par pt told to f/up with Dr Castrejon was being followed by Dr Artis but not able to get an appt.  \par \par

## 2022-01-05 NOTE — HISTORY OF PRESENT ILLNESS
[de-identified] : 81 y/o M with localized prostate Ca s/p recent RT and brachytherapy at [facility], DM, h/o T-cell lymphoma s/p ?phototherapy at [facility] in , h/o multiple SCCs and basal cell Ca s/p excision was referred by his dermatologist Dr. Paul Mendiola for further evaluation and management of anemia. \par \par Upon review of records, his Hb was 9 with MCV 93 from 5/15/2020. WBC and plts were wnl. Another abnormality was Alk ph of 197. Pt denies having any melena, BRBPR or gross hematuria. No bleeding from any other site. No CP, SOB, palpitations, dizziness. Has some fatigue. Last colonoscopy and EGD were about 7 years back. He has h/o H.pylori and PUD. He has been on Nexium for several years. \par \par According to the pt, his [place] was 9 and he was also started on lupron and casodex. His brother  of prostate Ca. Pt's genetic evaluation was unremarkable.  [de-identified] : 6/5/2020\[facility] Patient is here for a follow-up visit for Anemia and Prostate Cancer.  He is feeling well with no new complaints.  Most recent CBC shows slight improvement in hgb, now 10.7g/dL.  Patient denies fever, chills, palpitations, new bony pain, new LUTs or bleeding.  He states he is approximately 1 month post RT for prostate cancer at [facility].  \par \par 10/30/2020\[facility] Patient is here for a follow-up visit for Anemia and Prostate Cancer.  He is feeling well with no new complaints.  Most recent CBC shows stable hgb at 10.7g/dL.  Patient denies fever, chills, palpitations, bony pain, new LUTs or bleeding.  He follows with [facility], he is s/p RT for prostate cancer there.  He states he takes oral iron and Vit B12 daily.  He is participating in PT for back strain.   \par \par 1/8/21\[facility] Patient is here for a follow-up visit for Anemia and Prostate Cancer.  He is feeling well with no new complaints.  Most recent CBC shows stable and mild, hgb at 11.1g/dL today.  Patient denies fever, chills, palpitations, worsening bony pain, pica, new LUTs or bleeding.  He still reports urinary frequency which is no worse.  Decreased libido but he does not view this as bothersome and we discussed this may be common amongst patients who undergo tx for prostate cancer.  He follows with [facility], he is s/p RT for prostate cancer there.  He continues with Vit B12 PO daily.  He is unsure of most recent PSA but is following with [facility] next week.  He is pending repeat Bone Scan and MR imaging next week there.  \par \par 5/14/21\[facility] Patient is here for a follow-up visit for Anemia and Prostate Cancer.  He is feeling well with no new complaints. Pt noticed a 10lb weight loss was concerned. Pt had joined gym at the time of the weight loss, pt has since started eating more and noticed weight loss stopped and he gained back 4lb. He continues with urinary frequency.  Otherwise feels well. no abdominal pain no n/v no fever no chills.  Pt Has not been back to [facility] was told no more f/up needed.

## 2022-03-03 DIAGNOSIS — D64.9 ANEMIA, UNSPECIFIED: ICD-10-CM

## 2022-03-03 DIAGNOSIS — C61 MALIGNANT NEOPLASM OF PROSTATE: ICD-10-CM

## 2022-04-15 NOTE — ED ADULT NURSE NOTE - PMH
Arthritis  oa  Cancer  bcc scc  Cancer  t cell lymphome 2006 s/p rad tx  Diabetes    GERD (gastroesophageal reflux disease)     Negative

## 2022-06-13 ENCOUNTER — APPOINTMENT (OUTPATIENT)
Dept: HEMATOLOGY ONCOLOGY | Facility: CLINIC | Age: 82
End: 2022-06-13

## 2022-06-22 ENCOUNTER — APPOINTMENT (OUTPATIENT)
Dept: HEMATOLOGY ONCOLOGY | Facility: CLINIC | Age: 82
End: 2022-06-22

## 2022-06-28 ENCOUNTER — NON-APPOINTMENT (OUTPATIENT)
Age: 82
End: 2022-06-28

## 2023-04-26 NOTE — ED PROVIDER NOTE - IV ALTEPLASE DOOR HIDDEN
Abdomen , soft, nontender, nondistended , no guarding or rigidity , no masses palpable , normal bowel sounds , Liver and Spleen,  no hepatosplenomegaly , liver nontender show

## 2023-08-22 NOTE — H&P PST ADULT - REASON FOR ADMISSION
Writer called back but no answer so left voice mail for patient to return phone call.     79 yr old man to past for right open carpal tunnel release no fever no uti uri cp palp sob pain at this time exercise tolerance is 2 flights no changes no denisha screen revd 79 yr old man to past for right open carpal tunnel release s/p left ctr 2 yrs ago rt hand dominant now for sx  no fever no uti uri cp palp sob pain at this time exercise tolerance is 2 flights no changes no denisha screen revd

## 2024-11-07 NOTE — PRE-ANESTHESIA EVALUATION ADULT - NSDENTALSD_ENT_ALL_CORE
Remains isolative and suspicious. C/o anxiety. Coping skills encouraged and to refrain from isolation. Routine ativan administered. Resting in bedroom w/ radio. Q 15 minute rounding preformed.    appears normal and intact

## 2025-06-25 NOTE — H&P PST ADULT - PRO TOBACCO TYPE
----- Message from Lucy Molina sent at 6/23/2025  1:30 PM CDT -----  Regarding: EKG Order  Please place and link an EKG order for the appointment scheduled on 6/20/25 thanks. Tracy [id number]   cigarettes/1/2 ppd  14 - 42